# Patient Record
Sex: FEMALE | Race: BLACK OR AFRICAN AMERICAN | NOT HISPANIC OR LATINO
[De-identification: names, ages, dates, MRNs, and addresses within clinical notes are randomized per-mention and may not be internally consistent; named-entity substitution may affect disease eponyms.]

---

## 2019-06-03 ENCOUNTER — APPOINTMENT (OUTPATIENT)
Dept: ORTHOPEDIC SURGERY | Facility: CLINIC | Age: 69
End: 2019-06-03
Payer: MEDICARE

## 2019-06-03 VITALS — HEART RATE: 55 BPM | SYSTOLIC BLOOD PRESSURE: 146 MMHG | DIASTOLIC BLOOD PRESSURE: 77 MMHG | HEIGHT: 66 IN

## 2019-06-03 PROCEDURE — 73562 X-RAY EXAM OF KNEE 3: CPT | Mod: RT

## 2019-06-03 PROCEDURE — 72170 X-RAY EXAM OF PELVIS: CPT

## 2019-06-03 PROCEDURE — 99214 OFFICE O/P EST MOD 30 MIN: CPT

## 2019-06-03 NOTE — DISCUSSION/SUMMARY
[de-identified] : With predominantly right anterior knee pain following revision total knee replacement. Differential diagnosis includes possible subclinical patella clunk, borderline flexion stability, referred pain from lumbar spine. For now given note her primary care physician to refer patient for pain management for lumbar spine

## 2019-06-03 NOTE — HISTORY OF PRESENT ILLNESS
[de-identified] : 68 year old female presents s/p right TKR 2014.Patient began to express chronic intermittent right anterior knee pain with weightbearing. UltimatelyEyade underwent a "change in plastic" in her knee replacement subsequently.Postoperatively he reports increased chronic right anterior and at times lateral and proximal calf pain The pain is intermittent, chronic and is in the anterior and lateral aspect of the right knee. She reports that recently she has had back pain.Recent Blood tests By revision surgeon were According to the patient negative for infection in the right knee.

## 2019-06-03 NOTE — ADDENDUM
[FreeTextEntry1] : I, Raúl Santiago, acted solely as a scribe for Dr. Doroteo Wood on 06/03/2019  .\par  \par All medical record entries made by the scribe were at my, Dr. Doroteo Wood, direction and personally dictated by me on 06/03/2019. I have reviewed the chart and agree that the record accurately reflects my personal performance of the history, physical exam, assessment and plan. I have also personally directed, reviewed, and agreed with the chart.\par

## 2019-06-03 NOTE — PHYSICAL EXAM
[de-identified] : Well-nourished, in no acute distress\par Alert and oriented to time, place and person\par Skin: no lesions discoloration\par Respirations: unlabored\par Cardiac: no leg swelling\par Lymphatic: no groin adenopathy\par right knee: Healed neutral no effusion flexion 0/95 ligaments intact negative flexion distraction [de-identified] : X-rays right knee 3 views AP lateral sunrise satisfactory postop pins right total knee replacement\par

## 2021-05-19 ENCOUNTER — APPOINTMENT (OUTPATIENT)
Dept: ORTHOPEDIC SURGERY | Facility: CLINIC | Age: 71
End: 2021-05-19
Payer: MEDICARE

## 2021-05-19 DIAGNOSIS — M16.11 UNILATERAL PRIMARY OSTEOARTHRITIS, RIGHT HIP: ICD-10-CM

## 2021-05-19 PROCEDURE — 73562 X-RAY EXAM OF KNEE 3: CPT | Mod: RT

## 2021-05-19 PROCEDURE — 73501 X-RAY EXAM HIP UNI 1 VIEW: CPT | Mod: RT

## 2021-05-19 PROCEDURE — 99213 OFFICE O/P EST LOW 20 MIN: CPT

## 2021-05-21 PROBLEM — M16.11 PRIMARY LOCALIZED OSTEOARTHROSIS OF PELVIC REGION, RIGHT: Status: ACTIVE | Noted: 2021-05-21

## 2021-05-21 NOTE — HISTORY OF PRESENT ILLNESS
[de-identified] : Followup pain right knee Patient is status post right TKR 2014.Patient began to express chronic intermittent right anterior knee pain with weightbearing. Ultimately She underwent a "change in plastic" in her knee replacement subsequently.Postoperatively he reports increased chronic right anterior and at times lateral and proximal calf pain The pain is intermittent, chronic and is in the anterior and lateral aspect of the right knee. She reports that recently she has had back pain.And underwent epidural steroid injection with no symptom improvement Blood tests In the past By revision surgeon were According to the patient negative for infection in the right knee.

## 2021-05-21 NOTE — PHYSICAL EXAM
[de-identified] : Well-nourished, in no acute distress\par Alert and oriented to time, place and person\par Skin: no lesions discoloration\par Respirations: unlabored\par Cardiac: no leg swelling\par Lymphatic: no groin adenopathy\par right knee: Healed neutral no effusion flexion 0/95 ligaments intact negative flexion distraction\par Reflexes patella Achilles to posterior across 4 pound 5/5 like with left\par Right hip no local tenderness satisfactory pain-free passive motion [de-identified] : X-ray AP pelvis right hip to assess fracture postop appearance right total hip.\par X-ray right knee multiple views component alignment maintained

## 2021-05-21 NOTE — CONSULT LETTER
[Dear  ___] : Dear  [unfilled], [Consult Letter:] : I had the pleasure of evaluating your patient, [unfilled]. [Consult Closing:] : Thank you very much for allowing me to participate in the care of this patient.  If you have any questions, please do not hesitate to contact me. [Sincerely,] : Sincerely, [FreeTextEntry2] : JOSE VELÁSQUEZ [FreeTextEntry3] : Doroteo Wood MD, FAAOS\par Total Hip and Total Knee Replacement \par Anterior Total Hip Replacement\par [facility]\par [address]\par [place], [place] \par [phone]\par fax [phone]\par

## 2021-05-21 NOTE — DISCUSSION/SUMMARY
[de-identified] : Given persistent pain right knee satisfactory component alignment rule out latent deep infection. Plan MRI right knee, CBC differential C-reactive protein sedimentation rate

## 2021-05-24 ENCOUNTER — OUTPATIENT (OUTPATIENT)
Dept: OUTPATIENT SERVICES | Facility: HOSPITAL | Age: 71
LOS: 1 days | End: 2021-05-24

## 2021-05-28 ENCOUNTER — APPOINTMENT (OUTPATIENT)
Dept: MRI IMAGING | Facility: CLINIC | Age: 71
End: 2021-05-28
Payer: MEDICARE

## 2021-05-28 PROCEDURE — 73721 MRI JNT OF LWR EXTRE W/O DYE: CPT | Mod: 26,RT,ME

## 2021-05-28 PROCEDURE — G1004: CPT

## 2021-06-02 LAB
BASOPHILS # BLD AUTO: 0.06 K/UL
BASOPHILS NFR BLD AUTO: 0.9 %
CRP SERPL-MCNC: 23 MG/L
EOSINOPHIL # BLD AUTO: 0.06 K/UL
EOSINOPHIL NFR BLD AUTO: 0.9 %
ERYTHROCYTE [SEDIMENTATION RATE] IN BLOOD BY WESTERGREN METHOD: 14 MM/HR
HCT VFR BLD CALC: 45.7 %
HGB BLD-MCNC: 14.2 G/DL
IMM GRANULOCYTES NFR BLD AUTO: 0.2 %
LYMPHOCYTES # BLD AUTO: 2.44 K/UL
LYMPHOCYTES NFR BLD AUTO: 38.5 %
MAN DIFF?: NORMAL
MCHC RBC-ENTMCNC: 26.9 PG
MCHC RBC-ENTMCNC: 31.1 GM/DL
MCV RBC AUTO: 86.7 FL
MONOCYTES # BLD AUTO: 0.68 K/UL
MONOCYTES NFR BLD AUTO: 10.7 %
NEUTROPHILS # BLD AUTO: 3.09 K/UL
NEUTROPHILS NFR BLD AUTO: 48.8 %
PLATELET # BLD AUTO: 323 K/UL
RBC # BLD: 5.27 M/UL
RBC # FLD: 14.4 %
WBC # FLD AUTO: 6.34 K/UL

## 2021-06-09 ENCOUNTER — APPOINTMENT (OUTPATIENT)
Dept: ORTHOPEDIC SURGERY | Facility: CLINIC | Age: 71
End: 2021-06-09
Payer: MEDICARE

## 2021-06-09 PROCEDURE — 99213 OFFICE O/P EST LOW 20 MIN: CPT

## 2021-06-09 NOTE — DISCUSSION/SUMMARY
[de-identified] : Impression chronic pain right knee with past history of revision total knee replacement and right total hip replacement.  Positive positive radiographic and laboratory findings include markedly elevated C-reactive protein large effusion right knee and cortical hypertrophy around right femoral stem therefore rule out infection right knee, loosening right femoral stem\par Plan x-ray guided aspiration right knee for cell count culture and sensitivity, MRI right hip

## 2021-06-09 NOTE — CONSULT LETTER
[Dear  ___] : Dear  [unfilled], [Consult Letter:] : I had the pleasure of evaluating your patient, [unfilled]. [Consult Closing:] : Thank you very much for allowing me to participate in the care of this patient.  If you have any questions, please do not hesitate to contact me. [Sincerely,] : Sincerely, [FreeTextEntry2] : JOSE VELÁSQUEZ [FreeTextEntry3] : Doroteo Wood MD, FAAOS\par Total Hip and Total Knee Replacement \par Anterior Total Hip Replacement\par [facility]\par [address]\par [place], [place] \par [phone]\par fax [phone]\par

## 2021-06-09 NOTE — PHYSICAL EXAM
[de-identified] : Well-nourished, in no acute distress\par Alert and oriented to time, place and person\par Skin: no lesions discoloration\par Respirations: unlabored\par Cardiac: no leg swelling\par Lymphatic: no groin adenopathy\par right knee: Healed neutral effusion 1+ flexion 0/95 ligaments intact negative flexion distraction\par Reflexes patella Achilles to posterior across 4 pound 5/5 like with left\par Right hip no local tenderness satisfactory pain-free passive motion [de-identified] : MRI right knee May 28, 2021\par \par Review x-ray AP pelvis right hip reveals cortical hypertrophy around distal third of stem

## 2021-06-09 NOTE — HISTORY OF PRESENT ILLNESS
[de-identified] : Followup pain right knee Patient is status post right TKR 2014.Patient began to express chronic intermittent right anterior knee pain with weightbearing. Ultimately She underwent a "change in plastic" in her knee replacement subsequently.Postoperatively She reports increased chronic right anterior and lateral  and at times lateral and proximal calf pain The pain is intermittent, chronic and is in the anterior and lateral aspect of the right knee. She reports that recently she has had back pain.And underwent epidural steroid injection with no symptom improvement Blood tests In the past By revision surgeon were According to the patient negative for infection in the right knee.

## 2021-08-20 ENCOUNTER — OUTPATIENT (OUTPATIENT)
Dept: OUTPATIENT SERVICES | Facility: HOSPITAL | Age: 71
LOS: 1 days | End: 2021-08-20
Payer: MEDICARE

## 2021-08-20 ENCOUNTER — RESULT REVIEW (OUTPATIENT)
Age: 71
End: 2021-08-20

## 2021-08-20 ENCOUNTER — APPOINTMENT (OUTPATIENT)
Dept: ULTRASOUND IMAGING | Facility: CLINIC | Age: 71
End: 2021-08-20
Payer: MEDICARE

## 2021-08-20 DIAGNOSIS — Z96.651 PRESENCE OF RIGHT ARTIFICIAL KNEE JOINT: ICD-10-CM

## 2021-08-20 PROCEDURE — 20611 DRAIN/INJ JOINT/BURSA W/US: CPT | Mod: RT

## 2021-08-20 PROCEDURE — 20611 DRAIN/INJ JOINT/BURSA W/US: CPT

## 2021-11-05 DIAGNOSIS — Z96.641 PRESENCE OF RIGHT ARTIFICIAL HIP JOINT: ICD-10-CM

## 2022-04-12 ENCOUNTER — APPOINTMENT (OUTPATIENT)
Dept: ORTHOPEDIC SURGERY | Facility: CLINIC | Age: 72
End: 2022-04-12
Payer: MEDICARE

## 2022-04-12 VITALS — HEIGHT: 66 IN | WEIGHT: 161 LBS | BODY MASS INDEX: 25.88 KG/M2

## 2022-04-12 DIAGNOSIS — I51.9 HEART DISEASE, UNSPECIFIED: ICD-10-CM

## 2022-04-12 DIAGNOSIS — Z95.828 PRESENCE OF OTHER VASCULAR IMPLANTS AND GRAFTS: ICD-10-CM

## 2022-04-12 DIAGNOSIS — Z86.2 PERSONAL HISTORY OF DISEASES OF THE BLOOD AND BLOOD-FORMING ORGANS AND CERTAIN DISORDERS INVOLVING THE IMMUNE MECHANISM: ICD-10-CM

## 2022-04-12 PROCEDURE — 99213 OFFICE O/P EST LOW 20 MIN: CPT

## 2022-04-12 NOTE — ASSESSMENT
[FreeTextEntry1] : Previous doc:\par has had w/u for infection and negative - rev was linear exchange and did not help her - no obv instability - may have some tibial loosening - ? on xray - ROM is moderate 3-90 -- recommend bone scan eval for tibial loosening - review old documents\par 1/25/22: Bone scan pos for loosening - asp today r/o infection. Had cardiac stent in Oct 2021.\par 3/1/22: Discussed rev TKA for aseptic loosening - she is going to discuss this with her cardiologist and come back to discuss booking surgery with Dr. Adkins.\par \par 4/12/22: Continuing R knee pain. 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 Discussed the procedure in detail and she would like to proceed. f/up in 1 month

## 2022-04-12 NOTE — PHYSICAL EXAM
[Right] : right knee [] : lateral tibial plateau tenderness [4___] : quadriceps 4[unfilled]/5 [TWNoteComboBox7] : flexion 90 degrees [de-identified] : extension 3 degrees

## 2022-04-12 NOTE — HISTORY OF PRESENT ILLNESS
[10] : 10 [6] : 6 [Sharp] : sharp [Frequent] : frequent [Ice] : ice [Lying in bed] : lying in bed [de-identified] : 4/12/22: She followed up with her cardiogist- she is able to do surgery 6 months after stent placement \par \par Prev doc:\par Right TKA 2014 Dr. Wood which helped initially then developed more pain. Rev 2018 Dr. Samson no relief - poly exchange and synovectomy - . Right LISA 2011 Dr. Wood no issue. Lately is having some burning pain in right hip/buttock. Recent cardiac stent 10/8/21 - on brilinta. - constant pain in knee and cannot go up stairs\par 1/25/22: F/U bone scan, cont pain.\par 3/1/22: Felt better for a day after the aspiration but pain has returned. [] : no [de-identified] : dejan [de-identified] : right tka [de-identified] : xray [de-identified] : water therapy

## 2022-05-24 ENCOUNTER — APPOINTMENT (OUTPATIENT)
Dept: ORTHOPEDIC SURGERY | Facility: CLINIC | Age: 72
End: 2022-05-24
Payer: MEDICARE

## 2022-05-24 VITALS — WEIGHT: 155 LBS | BODY MASS INDEX: 24.91 KG/M2 | HEIGHT: 66 IN

## 2022-05-24 PROCEDURE — 99214 OFFICE O/P EST MOD 30 MIN: CPT

## 2022-05-24 NOTE — PHYSICAL EXAM
[Right] : right knee [] : lateral tibial plateau tenderness [4___] : quadriceps 4[unfilled]/5 [TWNoteComboBox7] : flexion 90 degrees [de-identified] : extension 3 degrees

## 2022-05-24 NOTE — DISCUSSION/SUMMARY
[Surgical risks reviewed] : Surgical risks reviewed [de-identified] : The natural progression of Osteoarthritis was explained to the patient.  We discussed the possible treatment options from conservative to operative.  These included NSAIDS, Glucosamine and Chondrotin sulfate, and Physical Therapy as well different types of injections.  We also discussed that at some point they may progress to needed a TKA.  Information and pamphlets were given when appropriate.\par \par Patient Complains of pain in Knee with a level that often reaches greater than a 8/10. The Pain has been\par progressively worsening of his/her treatment coarse. The pain has interfered with their ADLs and worsens with\par weight bearing. On exam they often have episodes of swelling/effusion with limited ROM. Pain worsens with ROM\par passive and active and I can palpate crepitus.\par  X rays were reviewed with the patient and they show joint space narrowing, subchondral sclerosis,\par osteophyte formation, and subchondral cysts.\par  After a period of more than 12 weeks physical therapy or exercise program done with me or another\par treating physician they have continued pain. The patient has failed a trial of NSAID medication or pain relieves if\par they were unable to tolerate NSAID medications as well as a series of injection, steroid or Hyaluronic Acid. After a\par long discussion with the patient both the patient and I have decided we have exhausted all forms of less radical\par treatments and they would like to proceed with Total Knee Replacement\par \par We discussed my findings and the natural history of their condition.  We talked about the details of the proposed surgery and the recovery.  We discussed the material risks, possible benefits and alternatives to surgery.  The risks include but are not limited to infection, bleeding and possible need for blood transfusion, fracture, bowel blockage, bladder retention or infection, need for reoperation, stiffness and/or limited range of motion, possible damage to nerves and blood vessels, failure of fixation of components, risk of deep vein thromboses and pulmonary embolism, wound healing problems, dislocation, and possible leg length discrepancy.  Although incredibly rare, we also discussed the risks of a cardiac event, stroke and even death during, or following, the surgery.  We discussed the type of implants the patient will be receiving and the type of fixation that will be used, as well as whether a robot or computer navigation aide will be used.  The patient understands they will need medical clearance and will attend a preoperative joint education class.  We also discussed the type of anesthesia they will receive, and the risks associated with hospital or rehab length of stay, obesity, diabetes and smoking.\par

## 2022-05-24 NOTE — HISTORY OF PRESENT ILLNESS
[6] : 6 [9] : 9 [Dull/Aching] : dull/aching [de-identified] : 5/24/22: continued pain in right knee. Scheduled for R rev TKA 7/13\par \par Prev doc:\par Right TKA 2014 Dr. Wood which helped initially then developed more pain. Rev 2018 Dr. Samson no relief - poly exchange and synovectomy - . Right LISA 2011 Dr. Wood no issue. Lately is having some burning pain in right hip/buttock. Recent cardiac stent 10/8/21 - on brilinta. - constant pain in knee and cannot go up stairs\par 1/25/22: F/U bone scan, cont pain.\par 3/1/22: Felt better for a day after the aspiration but pain has returned.\par 4/12/22: She followed up with her cardiogist- she is able to do surgery 6 months after stent placement

## 2022-05-24 NOTE — ASSESSMENT
[FreeTextEntry1] : Previous doc:\par has had w/u for infection and negative - rev was linear exchange and did not help her - no obv instability - may have some tibial loosening - ? on xray - ROM is moderate 3-90 -- recommend bone scan eval for tibial loosening - review old documents\par 1/25/22: Bone scan pos for loosening - asp today r/o infection. Had cardiac stent in Oct 2021.\par 3/1/22: Discussed rev TKA for aseptic loosening - she is going to discuss this with her cardiologist and come back to discuss booking surgery with Dr. Adkins.\par 4/12/22: Continuing R knee pain. 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 Discussed the procedure in detail and she would like to proceed. f/up in 1 month\par \par 5/24/22: Scheduled for R rev TKA in July. Risks and benefits again discussed and all questions answered. PCP believes elevated arsenic and mercury was from fish. Discussed risks due to antiphospholipid syndrome

## 2022-06-28 ENCOUNTER — OUTPATIENT (OUTPATIENT)
Dept: OUTPATIENT SERVICES | Facility: HOSPITAL | Age: 72
LOS: 1 days | Discharge: HOME | End: 2022-06-28

## 2022-06-28 VITALS
DIASTOLIC BLOOD PRESSURE: 84 MMHG | OXYGEN SATURATION: 100 % | HEIGHT: 66 IN | HEART RATE: 60 BPM | TEMPERATURE: 98 F | SYSTOLIC BLOOD PRESSURE: 149 MMHG | RESPIRATION RATE: 18 BRPM | WEIGHT: 157.85 LBS

## 2022-06-28 DIAGNOSIS — Z96.641 PRESENCE OF RIGHT ARTIFICIAL HIP JOINT: Chronic | ICD-10-CM

## 2022-06-28 DIAGNOSIS — Z96.651 PRESENCE OF RIGHT ARTIFICIAL KNEE JOINT: ICD-10-CM

## 2022-06-28 DIAGNOSIS — I10 ESSENTIAL (PRIMARY) HYPERTENSION: ICD-10-CM

## 2022-06-28 DIAGNOSIS — Z86.2 PERSONAL HISTORY OF DISEASES OF THE BLOOD AND BLOOD-FORMING ORGANS AND CERTAIN DISORDERS INVOLVING THE IMMUNE MECHANISM: ICD-10-CM

## 2022-06-28 DIAGNOSIS — Z96.651 PRESENCE OF RIGHT ARTIFICIAL KNEE JOINT: Chronic | ICD-10-CM

## 2022-06-28 DIAGNOSIS — I25.10 ATHEROSCLEROTIC HEART DISEASE OF NATIVE CORONARY ARTERY WITHOUT ANGINA PECTORIS: ICD-10-CM

## 2022-06-28 DIAGNOSIS — Z01.818 ENCOUNTER FOR OTHER PREPROCEDURAL EXAMINATION: ICD-10-CM

## 2022-06-28 LAB
A1C WITH ESTIMATED AVERAGE GLUCOSE RESULT: 5.7 % — HIGH (ref 4–5.6)
ALBUMIN SERPL ELPH-MCNC: 4 G/DL — SIGNIFICANT CHANGE UP (ref 3.3–5)
ALP SERPL-CCNC: 90 U/L — SIGNIFICANT CHANGE UP (ref 40–120)
ALT FLD-CCNC: 29 U/L — SIGNIFICANT CHANGE UP (ref 12–78)
ANION GAP SERPL CALC-SCNC: 5 MMOL/L — SIGNIFICANT CHANGE UP (ref 5–17)
APTT BLD: 34 SEC — SIGNIFICANT CHANGE UP (ref 27.5–35.5)
AST SERPL-CCNC: 28 U/L — SIGNIFICANT CHANGE UP (ref 15–37)
BASOPHILS # BLD AUTO: 0.05 K/UL — SIGNIFICANT CHANGE UP (ref 0–0.2)
BASOPHILS NFR BLD AUTO: 0.8 % — SIGNIFICANT CHANGE UP (ref 0–2)
BILIRUB SERPL-MCNC: 0.8 MG/DL — SIGNIFICANT CHANGE UP (ref 0.2–1.2)
BLD GP AB SCN SERPL QL: SIGNIFICANT CHANGE UP
BUN SERPL-MCNC: 19 MG/DL — SIGNIFICANT CHANGE UP (ref 7–23)
CALCIUM SERPL-MCNC: 10.1 MG/DL — SIGNIFICANT CHANGE UP (ref 8.5–10.1)
CHLORIDE SERPL-SCNC: 101 MMOL/L — SIGNIFICANT CHANGE UP (ref 96–108)
CO2 SERPL-SCNC: 33 MMOL/L — HIGH (ref 22–31)
CREAT SERPL-MCNC: 0.89 MG/DL — SIGNIFICANT CHANGE UP (ref 0.5–1.3)
EGFR: 69 ML/MIN/1.73M2 — SIGNIFICANT CHANGE UP
EOSINOPHIL # BLD AUTO: 0.11 K/UL — SIGNIFICANT CHANGE UP (ref 0–0.5)
EOSINOPHIL NFR BLD AUTO: 1.8 % — SIGNIFICANT CHANGE UP (ref 0–6)
ESTIMATED AVERAGE GLUCOSE: 117 MG/DL — HIGH (ref 68–114)
GLUCOSE SERPL-MCNC: 89 MG/DL — SIGNIFICANT CHANGE UP (ref 70–99)
HCT VFR BLD CALC: 45.1 % — HIGH (ref 34.5–45)
HGB BLD-MCNC: 15 G/DL — SIGNIFICANT CHANGE UP (ref 11.5–15.5)
IMM GRANULOCYTES NFR BLD AUTO: 0.3 % — SIGNIFICANT CHANGE UP (ref 0–1.5)
INR BLD: 0.97 RATIO — SIGNIFICANT CHANGE UP (ref 0.88–1.16)
LYMPHOCYTES # BLD AUTO: 1.9 K/UL — SIGNIFICANT CHANGE UP (ref 1–3.3)
LYMPHOCYTES # BLD AUTO: 30.7 % — SIGNIFICANT CHANGE UP (ref 13–44)
MCHC RBC-ENTMCNC: 27.9 PG — SIGNIFICANT CHANGE UP (ref 27–34)
MCHC RBC-ENTMCNC: 33.3 G/DL — SIGNIFICANT CHANGE UP (ref 32–36)
MCV RBC AUTO: 83.8 FL — SIGNIFICANT CHANGE UP (ref 80–100)
MONOCYTES # BLD AUTO: 0.7 K/UL — SIGNIFICANT CHANGE UP (ref 0–0.9)
MONOCYTES NFR BLD AUTO: 11.3 % — SIGNIFICANT CHANGE UP (ref 2–14)
MRSA PCR RESULT.: SIGNIFICANT CHANGE UP
NEUTROPHILS # BLD AUTO: 3.4 K/UL — SIGNIFICANT CHANGE UP (ref 1.8–7.4)
NEUTROPHILS NFR BLD AUTO: 55.1 % — SIGNIFICANT CHANGE UP (ref 43–77)
NRBC # BLD: 0 /100 WBCS — SIGNIFICANT CHANGE UP (ref 0–0)
PLATELET # BLD AUTO: 238 K/UL — SIGNIFICANT CHANGE UP (ref 150–400)
POTASSIUM SERPL-MCNC: 3.6 MMOL/L — SIGNIFICANT CHANGE UP (ref 3.5–5.3)
POTASSIUM SERPL-SCNC: 3.6 MMOL/L — SIGNIFICANT CHANGE UP (ref 3.5–5.3)
PROT SERPL-MCNC: 7.9 GM/DL — SIGNIFICANT CHANGE UP (ref 6–8.3)
PROTHROM AB SERPL-ACNC: 11.5 SEC — SIGNIFICANT CHANGE UP (ref 10.5–13.4)
RBC # BLD: 5.38 M/UL — HIGH (ref 3.8–5.2)
RBC # FLD: 13.6 % — SIGNIFICANT CHANGE UP (ref 10.3–14.5)
S AUREUS DNA NOSE QL NAA+PROBE: SIGNIFICANT CHANGE UP
SODIUM SERPL-SCNC: 139 MMOL/L — SIGNIFICANT CHANGE UP (ref 135–145)
WBC # BLD: 6.18 K/UL — SIGNIFICANT CHANGE UP (ref 3.8–10.5)
WBC # FLD AUTO: 6.18 K/UL — SIGNIFICANT CHANGE UP (ref 3.8–10.5)

## 2022-06-28 PROCEDURE — 93010 ELECTROCARDIOGRAM REPORT: CPT

## 2022-06-28 RX ORDER — SODIUM CHLORIDE 9 MG/ML
3 INJECTION INTRAMUSCULAR; INTRAVENOUS; SUBCUTANEOUS EVERY 8 HOURS
Refills: 0 | Status: DISCONTINUED | OUTPATIENT
Start: 2022-07-13 | End: 2022-07-15

## 2022-06-28 NOTE — H&P PST ADULT - PROBLEM SELECTOR PLAN 1
Right total knee arthroplasty revision attune  labs - cbc,pt/ptt,bmp,t&s,nose cx,ekg  M/C required  cardiac clearance  Rheumatology clearance   preop 3 day Hibiclens instruction reviewed and given .instructed on if  nose cx positive use mupuricin 5 days and checklist given  take routine meds DOS with sips of water. avoid NSAID and OTC supplements. verbalized understanding  information on proper nutrition , increase protein and better food choices provided in packet   Ensure clear given  Anesthesiologist to review PST labs, EKG, required clearances and optimization for surgery.

## 2022-06-28 NOTE — OCCUPATIONAL THERAPY INITIAL EVALUATION ADULT - LIVES WITH, PROFILE
and daughter in an apartment on the 11 floor with elevator access and not steps to negotiate. All living amenities are located on one level. The bathroom has a tub/shower combination, fixed shower head and standard toilet and with adequate space to fit a commode over it./spouse

## 2022-06-28 NOTE — OCCUPATIONAL THERAPY INITIAL EVALUATION ADULT - DEEP PRESSURE SENSATION, RLE, OT EVAL
No new care gaps identified.  Powered by [employer]. Reference number: [id number]. 9/17/2020 12:36:34 PM   CDT  
within normal limits

## 2022-06-28 NOTE — H&P PST ADULT - NSICDXPASTMEDICALHX_GEN_ALL_CORE_FT
PAST MEDICAL HISTORY:  CAD (coronary artery disease) s/p ptca w/stent x1    GERD (gastroesophageal reflux disease)     H/O antiphospholipid syndrome

## 2022-06-28 NOTE — OCCUPATIONAL THERAPY INITIAL EVALUATION ADULT - PERTINENT HX OF CURRENT PROBLEM, REHAB EVAL
Pt is a 73 y/o female slated for elective surgery for the 3rd revision of right TKR, with MD Adkins due to OA, chronic pain and DJD. Pt reported frequent buckling in her right knee, but denied any falls in the past 3-6 months

## 2022-06-28 NOTE — OCCUPATIONAL THERAPY INITIAL EVALUATION ADULT - ANTICIPATED DISCHARGE DISPOSITION, OT EVAL
Recommend home with 3-in-1 commode,and OT referral to enable patient to safely perform ADL management and functional mobility.  Pt has a rolling walker and SAC.

## 2022-06-28 NOTE — H&P PST ADULT - ASSESSMENT
72F pmh cad (s/p ptca w/stent x1), gerd, htn, antiphospholipid syndrome c/o right knee pain after right total knee replacement and revision, here for PST for scheduled Right total knee arthroplasty revision  CAPRINI SCORE    AGE RELATED RISK FACTORS                                                       MOBILITY RELATED FACTORS  [ ] Age 41-60 years                                            (1 Point)                  [ ] Bed rest                                                        (1 Point)  [ x] Age: 61-74 years                                           (2 Points)                [ ] Plaster cast                                                   (2 Points)  [ ] Age= 75 years                                              (3 Points)                 [ ] Bed bound for more than 72 hours                   (2 Points)    DISEASE RELATED RISK FACTORS                                               GENDER SPECIFIC FACTORS  [x ] Edema in the lower extremities                       (1 Point)                  [ ] Pregnancy                                                     (1 Point)  [ ] Varicose veins                                               (1 Point)                  [ ] Post-partum < 6 weeks                                   (1 Point)             [ ] BMI > 25 Kg/m2                                            (1 Point)                  [ ] Hormonal therapy  or oral contraception            (1 Point)                 [ ] Sepsis (in the previous month)                        (1 Point)                  [ ] History of pregnancy complications  [ ] Pneumonia or serious lung disease                                               [ ] Unexplained or recurrent                       (1 Point)           (in the previous month)                               (1 Point)  [ ] Abnormal pulmonary function test                     (1 Point)                 SURGERY RELATED RISK FACTORS  [ ] Acute myocardial infarction                              (1 Point)                 [ ]  Section                                            (1 Point)  [ ] Congestive heart failure (in the previous month)  (1 Point)                 [ ] Minor surgery                                                 (1 Point)   [ ] Inflammatory bowel disease                             (1 Point)                 [ ] Arthroscopic surgery                                        (2 Points)  [ ] Central venous access                                    (2 Points)                [ ] General surgery lasting more than 45 minutes   (2 Points)       [ ] Stroke (in the previous month)                          (5 Points)               [x ] Elective arthroplasty                                        (5 Points)                                                                                                                                               HEMATOLOGY RELATED FACTORS                                                 TRAUMA RELATED RISK FACTORS  [ ] Prior episodes of VTE                                     (3 Points)                 [ ] Fracture of the hip, pelvis, or leg                       (5 Points)  [ ] Positive family history for VTE                         (3 Points)                 [ ] Acute spinal cord injury (in the previous month)  (5 Points)  [ ] Prothrombin 43463 A                                      (3 Points)                 [ ] Paralysis  (less than 1 month)                          (5 Points)  [ ] Factor V Leiden                                             (3 Points)                 [ ] Multiple Trauma within 1 month                         (5 Points)  [ ] Lupus anticoagulants                                     (3 Points)                                                           [ ] Anticardiolipin antibodies                                (3 Points)                                                       [ ] High homocysteine in the blood                      (3 Points)                                             [ ] Other congenital or acquired thrombophilia       (3 Points)                                                [ ] Heparin induced thrombocytopenia                  (3 Points)                                          Total Score [      8    ]

## 2022-06-28 NOTE — OCCUPATIONAL THERAPY INITIAL EVALUATION ADULT - ADDITIONAL COMMENTS
Presently , pt is functioning in her roles, self sufficient, & ambulating independently at home and in the community with SAC. Pt does not drive; instead she relies on public transportation of her children to drive her to her appointments. Pt c/o 7/10 pain in her right knee. The pain is exacerbated, by walking, prolonged standing, negotiating steps and is relieved with ice. Pt is right hand dominant, wears glasses for reading and distance. Pt scores 90% of patient specific scale .

## 2022-06-28 NOTE — OCCUPATIONAL THERAPY INITIAL EVALUATION ADULT - SOCIAL CONCERNS
Pt voiced concerns about her recovery at home. Pt endorsed that her children will be able to assist her/him after discharge home post-operatively after they return home from work. Pt will be alone during the days./Complex psychosocial needs/coping issues Pt voiced concerns about her recovery at home. Pt endorsed that her children will only be able to assist her after discharge home post-operatively after they return home from work. Pt will be alone during the days./Complex psychosocial needs/coping issues

## 2022-06-28 NOTE — OCCUPATIONAL THERAPY INITIAL EVALUATION ADULT - GENERAL OBSERVATIONS, REHAB EVAL
Chart reviewed. Patient encountered seated in chair in rehab preop room in [facility]. Patient underwent occupational therapy pre-operative consultation to determine current functional ADL limitations in order to provide the right equipment for patient to perform functional ADL post operation.

## 2022-06-28 NOTE — H&P PST ADULT - NSICDXPASTSURGICALHX_GEN_ALL_CORE_FT
PAST SURGICAL HISTORY:  H/O total knee replacement, right     History of total right hip replacement

## 2022-06-29 LAB — VIT D25+D1,25 OH+D1,25 PNL SERPL-MCNC: 53.8 PG/ML — SIGNIFICANT CHANGE UP (ref 19.9–79.3)

## 2022-07-11 ENCOUNTER — OUTPATIENT (OUTPATIENT)
Dept: OUTPATIENT SERVICES | Facility: HOSPITAL | Age: 72
LOS: 1 days | Discharge: HOME | End: 2022-07-11

## 2022-07-11 DIAGNOSIS — Z96.651 PRESENCE OF RIGHT ARTIFICIAL KNEE JOINT: Chronic | ICD-10-CM

## 2022-07-11 DIAGNOSIS — U07.1 COVID-19: ICD-10-CM

## 2022-07-11 DIAGNOSIS — Z96.641 PRESENCE OF RIGHT ARTIFICIAL HIP JOINT: Chronic | ICD-10-CM

## 2022-07-11 PROBLEM — Z86.2 PERSONAL HISTORY OF DISEASES OF THE BLOOD AND BLOOD-FORMING ORGANS AND CERTAIN DISORDERS INVOLVING THE IMMUNE MECHANISM: Chronic | Status: ACTIVE | Noted: 2022-06-28

## 2022-07-11 PROBLEM — I25.10 ATHEROSCLEROTIC HEART DISEASE OF NATIVE CORONARY ARTERY WITHOUT ANGINA PECTORIS: Chronic | Status: ACTIVE | Noted: 2022-06-28

## 2022-07-11 PROBLEM — K21.9 GASTRO-ESOPHAGEAL REFLUX DISEASE WITHOUT ESOPHAGITIS: Chronic | Status: ACTIVE | Noted: 2022-06-28

## 2022-07-11 LAB
FLUAV AG NPH QL: SIGNIFICANT CHANGE UP
FLUBV AG NPH QL: SIGNIFICANT CHANGE UP
SARS-COV-2 RNA SPEC QL NAA+PROBE: SIGNIFICANT CHANGE UP

## 2022-07-12 ENCOUNTER — FORM ENCOUNTER (OUTPATIENT)
Age: 72
End: 2022-07-12

## 2022-07-13 ENCOUNTER — RESULT REVIEW (OUTPATIENT)
Age: 72
End: 2022-07-13

## 2022-07-13 ENCOUNTER — INPATIENT (INPATIENT)
Facility: HOSPITAL | Age: 72
LOS: 1 days | Discharge: HOME | End: 2022-07-15
Attending: ORTHOPAEDIC SURGERY | Admitting: ORTHOPAEDIC SURGERY

## 2022-07-13 ENCOUNTER — APPOINTMENT (OUTPATIENT)
Dept: ORTHOPEDIC SURGERY | Facility: HOSPITAL | Age: 72
End: 2022-07-13

## 2022-07-13 ENCOUNTER — TRANSCRIPTION ENCOUNTER (OUTPATIENT)
Age: 72
End: 2022-07-13

## 2022-07-13 VITALS
OXYGEN SATURATION: 99 % | WEIGHT: 154.98 LBS | DIASTOLIC BLOOD PRESSURE: 73 MMHG | RESPIRATION RATE: 16 BRPM | SYSTOLIC BLOOD PRESSURE: 156 MMHG | HEIGHT: 66 IN | HEART RATE: 64 BPM | TEMPERATURE: 98 F

## 2022-07-13 DIAGNOSIS — Z96.641 PRESENCE OF RIGHT ARTIFICIAL HIP JOINT: Chronic | ICD-10-CM

## 2022-07-13 DIAGNOSIS — Z96.651 PRESENCE OF RIGHT ARTIFICIAL KNEE JOINT: Chronic | ICD-10-CM

## 2022-07-13 LAB
ANION GAP SERPL CALC-SCNC: 6 MMOL/L — SIGNIFICANT CHANGE UP (ref 5–17)
BUN SERPL-MCNC: 16 MG/DL — SIGNIFICANT CHANGE UP (ref 7–23)
CALCIUM SERPL-MCNC: 9.6 MG/DL — SIGNIFICANT CHANGE UP (ref 8.5–10.1)
CHLORIDE SERPL-SCNC: 106 MMOL/L — SIGNIFICANT CHANGE UP (ref 96–108)
CO2 SERPL-SCNC: 32 MMOL/L — HIGH (ref 22–31)
CREAT SERPL-MCNC: 0.89 MG/DL — SIGNIFICANT CHANGE UP (ref 0.5–1.3)
EGFR: 69 ML/MIN/1.73M2 — SIGNIFICANT CHANGE UP
GLUCOSE SERPL-MCNC: 85 MG/DL — SIGNIFICANT CHANGE UP (ref 70–99)
HCT VFR BLD CALC: 39.8 % — SIGNIFICANT CHANGE UP (ref 34.5–45)
HGB BLD-MCNC: 13.1 G/DL — SIGNIFICANT CHANGE UP (ref 11.5–15.5)
MCHC RBC-ENTMCNC: 28.2 PG — SIGNIFICANT CHANGE UP (ref 27–34)
MCHC RBC-ENTMCNC: 32.9 G/DL — SIGNIFICANT CHANGE UP (ref 32–36)
MCV RBC AUTO: 85.6 FL — SIGNIFICANT CHANGE UP (ref 80–100)
NRBC # BLD: 0 /100 WBCS — SIGNIFICANT CHANGE UP (ref 0–0)
PLATELET # BLD AUTO: 213 K/UL — SIGNIFICANT CHANGE UP (ref 150–400)
POTASSIUM SERPL-MCNC: 4.4 MMOL/L — SIGNIFICANT CHANGE UP (ref 3.5–5.3)
POTASSIUM SERPL-SCNC: 4.4 MMOL/L — SIGNIFICANT CHANGE UP (ref 3.5–5.3)
RBC # BLD: 4.65 M/UL — SIGNIFICANT CHANGE UP (ref 3.8–5.2)
RBC # FLD: 13.7 % — SIGNIFICANT CHANGE UP (ref 10.3–14.5)
SODIUM SERPL-SCNC: 144 MMOL/L — SIGNIFICANT CHANGE UP (ref 135–145)
WBC # BLD: 6.76 K/UL — SIGNIFICANT CHANGE UP (ref 3.8–10.5)
WBC # FLD AUTO: 6.76 K/UL — SIGNIFICANT CHANGE UP (ref 3.8–10.5)

## 2022-07-13 PROCEDURE — 88331 PATH CONSLTJ SURG 1 BLK 1SPC: CPT | Mod: 26

## 2022-07-13 PROCEDURE — 88300 SURGICAL PATH GROSS: CPT | Mod: 26,59

## 2022-07-13 PROCEDURE — 88305 TISSUE EXAM BY PATHOLOGIST: CPT | Mod: 26

## 2022-07-13 PROCEDURE — 27487 REVISE/REPLACE KNEE JOINT: CPT | Mod: AS,RT

## 2022-07-13 PROCEDURE — 27487 REVISE/REPLACE KNEE JOINT: CPT | Mod: RT

## 2022-07-13 DEVICE — IMPLANTABLE DEVICE: Type: IMPLANTABLE DEVICE | Site: RIGHT | Status: FUNCTIONAL

## 2022-07-13 DEVICE — CEMENT SIMPLEX WITH TOBRAMYCIN: Type: IMPLANTABLE DEVICE | Site: RIGHT | Status: FUNCTIONAL

## 2022-07-13 DEVICE — FEM TIB BASE ATTUNE CEM SZ 3: Type: IMPLANTABLE DEVICE | Site: RIGHT | Status: FUNCTIONAL

## 2022-07-13 RX ORDER — TICAGRELOR 90 MG/1
90 TABLET ORAL EVERY 12 HOURS
Refills: 0 | Status: DISCONTINUED | OUTPATIENT
Start: 2022-07-14 | End: 2022-07-15

## 2022-07-13 RX ORDER — OMEPRAZOLE 10 MG/1
1 CAPSULE, DELAYED RELEASE ORAL
Qty: 0 | Refills: 0 | DISCHARGE

## 2022-07-13 RX ORDER — BENZOCAINE AND MENTHOL 5; 1 G/100ML; G/100ML
1 LIQUID ORAL EVERY 4 HOURS
Refills: 0 | Status: DISCONTINUED | OUTPATIENT
Start: 2022-07-13 | End: 2022-07-15

## 2022-07-13 RX ORDER — PANTOPRAZOLE SODIUM 20 MG/1
40 TABLET, DELAYED RELEASE ORAL
Refills: 0 | Status: DISCONTINUED | OUTPATIENT
Start: 2022-07-13 | End: 2022-07-15

## 2022-07-13 RX ORDER — KETOROLAC TROMETHAMINE 30 MG/ML
30 SYRINGE (ML) INJECTION EVERY 6 HOURS
Refills: 0 | Status: DISCONTINUED | OUTPATIENT
Start: 2022-07-13 | End: 2022-07-14

## 2022-07-13 RX ORDER — AMLODIPINE BESYLATE 2.5 MG/1
10 TABLET ORAL DAILY
Refills: 0 | Status: DISCONTINUED | OUTPATIENT
Start: 2022-07-13 | End: 2022-07-15

## 2022-07-13 RX ORDER — TICAGRELOR 90 MG/1
1 TABLET ORAL
Qty: 0 | Refills: 0 | DISCHARGE

## 2022-07-13 RX ORDER — CELECOXIB 200 MG/1
200 CAPSULE ORAL EVERY 12 HOURS
Refills: 0 | Status: DISCONTINUED | OUTPATIENT
Start: 2022-07-14 | End: 2022-07-15

## 2022-07-13 RX ORDER — PANTOPRAZOLE SODIUM 20 MG/1
40 TABLET, DELAYED RELEASE ORAL
Refills: 0 | Status: DISCONTINUED | OUTPATIENT
Start: 2022-07-13 | End: 2022-07-14

## 2022-07-13 RX ORDER — ACETAMINOPHEN 500 MG
975 TABLET ORAL EVERY 8 HOURS
Refills: 0 | Status: DISCONTINUED | OUTPATIENT
Start: 2022-07-13 | End: 2022-07-15

## 2022-07-13 RX ORDER — TRAMADOL HYDROCHLORIDE 50 MG/1
50 TABLET ORAL EVERY 4 HOURS
Refills: 0 | Status: DISCONTINUED | OUTPATIENT
Start: 2022-07-13 | End: 2022-07-15

## 2022-07-13 RX ORDER — SPIRONOLACT/HYDROCHLOROTHIAZID 25 MG-25MG
1 TABLET ORAL
Qty: 0 | Refills: 0 | DISCHARGE

## 2022-07-13 RX ORDER — SPIRONOLACTONE 25 MG/1
25 TABLET, FILM COATED ORAL DAILY
Refills: 0 | Status: DISCONTINUED | OUTPATIENT
Start: 2022-07-13 | End: 2022-07-15

## 2022-07-13 RX ORDER — FENTANYL CITRATE 50 UG/ML
25 INJECTION INTRAVENOUS
Refills: 0 | Status: DISCONTINUED | OUTPATIENT
Start: 2022-07-13 | End: 2022-07-13

## 2022-07-13 RX ORDER — AMLODIPINE BESYLATE 2.5 MG/1
1 TABLET ORAL
Qty: 0 | Refills: 0 | DISCHARGE

## 2022-07-13 RX ORDER — HYDROXYCHLOROQUINE SULFATE 200 MG
200 TABLET ORAL DAILY
Refills: 0 | Status: DISCONTINUED | OUTPATIENT
Start: 2022-07-13 | End: 2022-07-14

## 2022-07-13 RX ORDER — ASPIRIN/CALCIUM CARB/MAGNESIUM 324 MG
81 TABLET ORAL
Refills: 0 | Status: DISCONTINUED | OUTPATIENT
Start: 2022-07-14 | End: 2022-07-15

## 2022-07-13 RX ORDER — ONDANSETRON 8 MG/1
4 TABLET, FILM COATED ORAL EVERY 6 HOURS
Refills: 0 | Status: DISCONTINUED | OUTPATIENT
Start: 2022-07-13 | End: 2022-07-15

## 2022-07-13 RX ORDER — MAGNESIUM HYDROXIDE 400 MG/1
30 TABLET, CHEWABLE ORAL DAILY
Refills: 0 | Status: DISCONTINUED | OUTPATIENT
Start: 2022-07-13 | End: 2022-07-15

## 2022-07-13 RX ORDER — SENNA PLUS 8.6 MG/1
2 TABLET ORAL AT BEDTIME
Refills: 0 | Status: DISCONTINUED | OUTPATIENT
Start: 2022-07-13 | End: 2022-07-15

## 2022-07-13 RX ORDER — SODIUM CHLORIDE 9 MG/ML
1000 INJECTION INTRAMUSCULAR; INTRAVENOUS; SUBCUTANEOUS
Refills: 0 | Status: DISCONTINUED | OUTPATIENT
Start: 2022-07-13 | End: 2022-07-15

## 2022-07-13 RX ORDER — CELECOXIB 200 MG/1
200 CAPSULE ORAL ONCE
Refills: 0 | Status: COMPLETED | OUTPATIENT
Start: 2022-07-13 | End: 2022-07-13

## 2022-07-13 RX ORDER — ONDANSETRON 8 MG/1
4 TABLET, FILM COATED ORAL ONCE
Refills: 0 | Status: DISCONTINUED | OUTPATIENT
Start: 2022-07-13 | End: 2022-07-13

## 2022-07-13 RX ORDER — POLYETHYLENE GLYCOL 3350 17 G/17G
17 POWDER, FOR SOLUTION ORAL AT BEDTIME
Refills: 0 | Status: DISCONTINUED | OUTPATIENT
Start: 2022-07-13 | End: 2022-07-15

## 2022-07-13 RX ORDER — HYDROCHLOROTHIAZIDE 25 MG
25 TABLET ORAL DAILY
Refills: 0 | Status: DISCONTINUED | OUTPATIENT
Start: 2022-07-13 | End: 2022-07-15

## 2022-07-13 RX ORDER — CEFAZOLIN SODIUM 1 G
2000 VIAL (EA) INJECTION EVERY 8 HOURS
Refills: 0 | Status: COMPLETED | OUTPATIENT
Start: 2022-07-13 | End: 2022-07-14

## 2022-07-13 RX ORDER — LANOLIN ALCOHOL/MO/W.PET/CERES
3 CREAM (GRAM) TOPICAL AT BEDTIME
Refills: 0 | Status: DISCONTINUED | OUTPATIENT
Start: 2022-07-13 | End: 2022-07-15

## 2022-07-13 RX ORDER — ACETAMINOPHEN 500 MG
1000 TABLET ORAL ONCE
Refills: 0 | Status: COMPLETED | OUTPATIENT
Start: 2022-07-13 | End: 2022-07-13

## 2022-07-13 RX ORDER — DEXAMETHASONE 0.5 MG/5ML
10 ELIXIR ORAL ONCE
Refills: 0 | Status: COMPLETED | OUTPATIENT
Start: 2022-07-14 | End: 2022-07-14

## 2022-07-13 RX ORDER — SODIUM CHLORIDE 9 MG/ML
1000 INJECTION, SOLUTION INTRAVENOUS
Refills: 0 | Status: DISCONTINUED | OUTPATIENT
Start: 2022-07-13 | End: 2022-07-13

## 2022-07-13 RX ADMIN — CELECOXIB 200 MILLIGRAM(S): 200 CAPSULE ORAL at 14:53

## 2022-07-13 RX ADMIN — Medication 1000 MILLIGRAM(S): at 21:50

## 2022-07-13 RX ADMIN — Medication 30 MILLIGRAM(S): at 21:50

## 2022-07-13 RX ADMIN — SODIUM CHLORIDE 3 MILLILITER(S): 9 INJECTION INTRAMUSCULAR; INTRAVENOUS; SUBCUTANEOUS at 21:35

## 2022-07-13 RX ADMIN — Medication 30 MILLIGRAM(S): at 21:36

## 2022-07-13 RX ADMIN — Medication 400 MILLIGRAM(S): at 21:35

## 2022-07-13 RX ADMIN — CELECOXIB 200 MILLIGRAM(S): 200 CAPSULE ORAL at 15:06

## 2022-07-13 NOTE — PATIENT PROFILE ADULT - FALL HARM RISK - HARM RISK INTERVENTIONS

## 2022-07-13 NOTE — BRIEF OPERATIVE NOTE - ANTIBIOTIC PROTOCOL
HPI and Plan:   See dictation    Orders Placed This Encounter   Procedures     Lipid Profile     EKG 12-lead complete w/read - Clinics (performed today)       Orders Placed This Encounter   Medications     loratadine (CLARITIN) 10 MG tablet     Sig: Take 10 mg by mouth daily     OnabotulinumtoxinA (BOTOX IJ)       Encounter Diagnoses   Name Primary?     High cholesterol Yes     Atypical chest pain        CURRENT MEDICATIONS:  Current Outpatient Prescriptions   Medication Sig Dispense Refill     loratadine (CLARITIN) 10 MG tablet Take 10 mg by mouth daily       OnabotulinumtoxinA (BOTOX IJ)        NORTRIPTYLINE HCL PO Take 200 mg by mouth daily       PAROXETINE HCL PO Take 25 mg by mouth daily       diltiazem (CARDIZEM CD) 120 MG 24 hr CD capsule Take 1 capsule (120 mg) by mouth daily You are due to see the cardiologist- please call [phone] to schedule. 90 capsule 0     gabapentin (NEURONTIN) 100 MG capsule Take 100 mg in the morning and 400 mg at bedtime for one week, then increase to 100 mg in the morning and 600 mg at bedtime. (Patient taking differently: Take 400 mg in the morning and 600 mg at bedtime.) 210 capsule 1       ALLERGIES     Allergies   Allergen Reactions     Codeine Sulfate Nausea and Vomiting     Contrast Dye Hives     Patient is allergic to CT iodine contrast.       Scopolamine Visual Disturbance     Imitrex [Sumatriptan] Rash     Naproxen Rash     Penicillins Rash     Per patient     Sulfa Drugs Rash       PAST MEDICAL HISTORY:  Past Medical History   Diagnosis Date     Abdominal pain      Anxiety      Asthma      C. difficile colitis      Headache(784.0) 12/10/2014     High cholesterol      Hyperlipidemia      Liver disease      Being evalted for fatty liver disease. ABnormal LFT.     Migraines      PONV (postoperative nausea and vomiting)      Sleep apnea      CPAP will bring.     Tremor        PAST SURGICAL HISTORY:  Past Surgical History   Procedure Laterality Date     Foot surgery        Bilateral foot reconstruction.      section       witth TL     Laparoscopic hysterectomy supracervical, bilateral salpingo-oophorectomy, combined  3/6/2013     Procedure: COMBINED LAPAROSCOPIC HYSTERECTOMY SUPRACERVICAL, SALPINGO-OOPHORECTOMY;  Laparoscopic Supracervical Hysterectomy, Right salpingo-oopherectomy;  Surgeon: Tanika Jones MD;  Location: [facility] OR     Uvulopalatopharyngoplasty       Plus Septoplasy.     Esophagoscopy, gastroscopy, duodenoscopy (egd), combined  10/21/2013     Procedure: COMBINED ESOPHAGOSCOPY, GASTROSCOPY, DUODENOSCOPY (EGD), BIOPSY SINGLE OR MULTIPLE;;  Surgeon: Rito Gupta MD;  Location:  GI     Hysterectomy       Endoscopic ultrasound, esophagoscopy, gastroscopy, duodenoscopy (egd), combined  13     Hc ugi endoscopy w eus  2013     Procedure: COMBINED ENDOSCOPIC ULTRASOUND, ESOPHAGOSCOPY, GASTROSCOPY, DUODENOSCOPY (EGD);  Surgeon: Emre Campbell MD;  Location:  GI       FAMILY HISTORY:  Family History   Problem Relation Age of Onset     Family history unknown: Yes       SOCIAL HISTORY:  Social History     Social History     Marital status:      Spouse name: N/A     Number of children: N/A     Years of education: N/A     Social History Main Topics     Smoking status: Current Every Day Smoker     Packs/day: 0.50     Years: 20.00     Types: Cigarettes     Smokeless tobacco: Never Used     Alcohol use 0.0 oz/week     0 Standard drinks or equivalent per week      Comment:  3 drinks weekly     Drug use: No     Sexual activity: Yes     Partners: Male     Other Topics Concern     None     Social History Narrative       Review of Systems:  Skin:  Negative     Eyes:  Positive for visual blurring  ENT:  Negative    Respiratory:  Positive for shortness of breath;cough;wheezing  Cardiovascular:    chest pain;edema;fatigue;Positive for  Gastroenterology: Positive for nausea  Genitourinary:  not assessed    Musculoskeletal:  Positive for neck pain;joint  "pain;muscular weakness  Neurologic:  Positive for headaches  Psychiatric:  Positive for anxiety  Heme/Lymph/Imm:  Positive for allergies;hay fever  Endocrine:  Negative      Physical Exam:  Vitals: /70  Pulse 82  Ht 1.626 m (5' 4\")  Wt 73 kg (161 lb)  LMP 07/11/2014  BMI 27.64 kg/m2    Constitutional:           Skin:           Head:           Eyes:           ENT:           Neck:           Chest:           Cardiac:                    Abdomen:           Vascular:                                        Extremities and Back:           Neurological:             Recent Lab Results:  LIPID RESULTS:  Lab Results   Component Value Date    CHOL 182 09/19/2011    HDL 49 (L) 09/19/2011     09/19/2011    TRIG 151 (H) 09/19/2011    CHOLHDLRATIO 3.7 09/19/2011       LIVER ENZYME RESULTS:  Lab Results   Component Value Date    AST 20 10/05/2016    ALT 42 10/05/2016       CBC RESULTS:  Lab Results   Component Value Date    WBC 10.5 10/05/2016    RBC 4.61 10/05/2016    HGB 14.4 10/05/2016    HCT 42.6 10/05/2016    MCV 92 10/05/2016    MCH 31.2 10/05/2016    MCHC 33.8 10/05/2016    RDW 12.2 10/05/2016     10/05/2016       BMP RESULTS:  Lab Results   Component Value Date     10/05/2016    POTASSIUM 4.2 10/05/2016    CHLORIDE 104 10/05/2016    CO2 26 10/05/2016    ANIONGAP 8 10/05/2016     (H) 10/05/2016    BUN 17 10/05/2016    CR 0.98 10/05/2016    GFRESTIMATED 61 10/05/2016    GFRESTBLACK 74 10/05/2016    DEWAYNE 9.0 10/05/2016        A1C RESULTS:  No results found for: A1C    INR RESULTS:  Lab Results   Component Value Date    INR 1.13 03/22/2013           CC  No referring provider defined for this encounter.                  " Followed protocol

## 2022-07-13 NOTE — CONSULT NOTE ADULT - SUBJECTIVE AND OBJECTIVE BOX
MICHELLE BONILLA is a 72y Female s/p RIGHT TOTAL KNEE ARTHROPLASTY REVISION ATTUNE      w/ h/o CAD (coronary artery disease)    GERD (gastroesophageal reflux disease)    H/O antiphospholipid syndrome      denies any chest pain shortness of breath palpitation dizziness lightheadedness nausea vomiting fever or chills    H/O total knee replacement, right    History of total right hip replacement        SH: doesnot smoke or drink at this time    codeine (Other)  Dilaudid (Other)  Percocet (Other)    acetaminophen     Tablet .. 975 milliGRAM(s) Oral every 8 hours  acetaminophen   IVPB .. 1000 milliGRAM(s) IV Intermittent once  amLODIPine   Tablet 10 milliGRAM(s) Oral daily  benzocaine 15 mG/menthol 3.6 mG Lozenge 1 Lozenge Oral every 4 hours PRN  ceFAZolin   IVPB 2000 milliGRAM(s) IV Intermittent every 8 hours  hydrochlorothiazide 25 milliGRAM(s) Oral daily  hydroxychloroquine 200 milliGRAM(s) Oral daily  ketorolac   Injectable 30 milliGRAM(s) IV Push every 6 hours  magnesium hydroxide Suspension 30 milliLiter(s) Oral daily PRN  melatonin 3 milliGRAM(s) Oral at bedtime PRN  multivitamin 1 Tablet(s) Oral daily  ondansetron Injectable 4 milliGRAM(s) IV Push every 6 hours PRN  pantoprazole    Tablet 40 milliGRAM(s) Oral before breakfast  pantoprazole    Tablet 40 milliGRAM(s) Oral before breakfast  polyethylene glycol 3350 17 Gram(s) Oral at bedtime  senna 2 Tablet(s) Oral at bedtime  sodium chloride 0.9% lock flush 3 milliLiter(s) IV Push every 8 hours  sodium chloride 0.9%. 1000 milliLiter(s) IV Continuous <Continuous>  spironolactone 25 milliGRAM(s) Oral daily  traMADol 50 milliGRAM(s) Oral every 4 hours PRN    T(C): 36.7 (07-13-22 @ 20:40), Max: 36.7 (07-13-22 @ 14:16)  HR: 55 (07-13-22 @ 20:40) (52 - 64)  BP: 109/72 (07-13-22 @ 20:40) (109/72 - 156/73)  RR: 18 (07-13-22 @ 20:40) (13 - 18)  SpO2: 95% (07-13-22 @ 20:40) (95% - 99%)  HEENT unremarkable  neck no JVD or bruit  heart normal S1 S2 RRR no gallops or rubs  chest clear to auscultation  abd sof nontender non distended +bs  ext no calf tenderness    A/P   DVT PX  pain control  bowel regimen   wound care as per ortho  GI PX  antiemetics prn  incentive spirometer

## 2022-07-13 NOTE — PATIENT PROFILE ADULT - NSPROPTRIGHTCAREGIVER_GEN_A_NUR
yes
I will SWITCH the dose or number of times a day I take the medications listed below when I get home from the hospital:    Coreg 25 mg oral tablet  -- 1 tab(s) by mouth 2 times a day    hydrALAZINE 25 mg oral tablet  -- 1 tab(s) by mouth 3 times a day    labetalol 200 mg oral tablet  -- 1 tab(s) by mouth 3 times a day    predniSONE 5 mg oral tablet  -- 1 tab(s) by mouth once a day

## 2022-07-14 ENCOUNTER — TRANSCRIPTION ENCOUNTER (OUTPATIENT)
Age: 72
End: 2022-07-14

## 2022-07-14 LAB
ANION GAP SERPL CALC-SCNC: 5 MMOL/L — SIGNIFICANT CHANGE UP (ref 5–17)
BUN SERPL-MCNC: 20 MG/DL — SIGNIFICANT CHANGE UP (ref 7–23)
CALCIUM SERPL-MCNC: 9 MG/DL — SIGNIFICANT CHANGE UP (ref 8.5–10.1)
CHLORIDE SERPL-SCNC: 105 MMOL/L — SIGNIFICANT CHANGE UP (ref 96–108)
CO2 SERPL-SCNC: 32 MMOL/L — HIGH (ref 22–31)
CREAT SERPL-MCNC: 0.82 MG/DL — SIGNIFICANT CHANGE UP (ref 0.5–1.3)
EGFR: 76 ML/MIN/1.73M2 — SIGNIFICANT CHANGE UP
GLUCOSE SERPL-MCNC: 108 MG/DL — HIGH (ref 70–99)
GRAM STN FLD: SIGNIFICANT CHANGE UP
HCT VFR BLD CALC: 38.1 % — SIGNIFICANT CHANGE UP (ref 34.5–45)
HGB BLD-MCNC: 12.4 G/DL — SIGNIFICANT CHANGE UP (ref 11.5–15.5)
MCHC RBC-ENTMCNC: 27.7 PG — SIGNIFICANT CHANGE UP (ref 27–34)
MCHC RBC-ENTMCNC: 32.5 G/DL — SIGNIFICANT CHANGE UP (ref 32–36)
MCV RBC AUTO: 85 FL — SIGNIFICANT CHANGE UP (ref 80–100)
NRBC # BLD: 0 /100 WBCS — SIGNIFICANT CHANGE UP (ref 0–0)
PLATELET # BLD AUTO: 206 K/UL — SIGNIFICANT CHANGE UP (ref 150–400)
POTASSIUM SERPL-MCNC: 4.1 MMOL/L — SIGNIFICANT CHANGE UP (ref 3.5–5.3)
POTASSIUM SERPL-SCNC: 4.1 MMOL/L — SIGNIFICANT CHANGE UP (ref 3.5–5.3)
RBC # BLD: 4.48 M/UL — SIGNIFICANT CHANGE UP (ref 3.8–5.2)
RBC # FLD: 13.4 % — SIGNIFICANT CHANGE UP (ref 10.3–14.5)
SODIUM SERPL-SCNC: 142 MMOL/L — SIGNIFICANT CHANGE UP (ref 135–145)
SPECIMEN SOURCE: SIGNIFICANT CHANGE UP
WBC # BLD: 10.77 K/UL — HIGH (ref 3.8–10.5)
WBC # FLD AUTO: 10.77 K/UL — HIGH (ref 3.8–10.5)

## 2022-07-14 PROCEDURE — 73560 X-RAY EXAM OF KNEE 1 OR 2: CPT | Mod: 26,RT

## 2022-07-14 RX ORDER — ASPIRIN/CALCIUM CARB/MAGNESIUM 324 MG
1 TABLET ORAL
Qty: 0 | Refills: 0 | DISCHARGE
Start: 2022-07-14

## 2022-07-14 RX ORDER — ASPIRIN/CALCIUM CARB/MAGNESIUM 324 MG
1 TABLET ORAL
Qty: 0 | Refills: 0 | DISCHARGE

## 2022-07-14 RX ORDER — CELECOXIB 200 MG/1
1 CAPSULE ORAL
Qty: 60 | Refills: 0
Start: 2022-07-14 | End: 2022-08-12

## 2022-07-14 RX ORDER — ACETAMINOPHEN 500 MG
1000 TABLET ORAL ONCE
Refills: 0 | Status: COMPLETED | OUTPATIENT
Start: 2022-07-14 | End: 2022-07-14

## 2022-07-14 RX ORDER — SENNA PLUS 8.6 MG/1
2 TABLET ORAL
Qty: 0 | Refills: 0 | DISCHARGE
Start: 2022-07-14

## 2022-07-14 RX ORDER — POLYETHYLENE GLYCOL 3350 17 G/17G
17 POWDER, FOR SOLUTION ORAL
Qty: 0 | Refills: 0 | DISCHARGE
Start: 2022-07-14

## 2022-07-14 RX ORDER — ACETAMINOPHEN 500 MG
3 TABLET ORAL
Qty: 0 | Refills: 0 | DISCHARGE
Start: 2022-07-14

## 2022-07-14 RX ADMIN — SENNA PLUS 2 TABLET(S): 8.6 TABLET ORAL at 22:20

## 2022-07-14 RX ADMIN — Medication 1 TABLET(S): at 12:54

## 2022-07-14 RX ADMIN — TICAGRELOR 90 MILLIGRAM(S): 90 TABLET ORAL at 06:24

## 2022-07-14 RX ADMIN — PANTOPRAZOLE SODIUM 40 MILLIGRAM(S): 20 TABLET, DELAYED RELEASE ORAL at 06:24

## 2022-07-14 RX ADMIN — Medication 1000 MILLIGRAM(S): at 22:35

## 2022-07-14 RX ADMIN — Medication 975 MILLIGRAM(S): at 15:00

## 2022-07-14 RX ADMIN — Medication 400 MILLIGRAM(S): at 22:20

## 2022-07-14 RX ADMIN — Medication 975 MILLIGRAM(S): at 06:24

## 2022-07-14 RX ADMIN — SODIUM CHLORIDE 3 MILLILITER(S): 9 INJECTION INTRAMUSCULAR; INTRAVENOUS; SUBCUTANEOUS at 06:24

## 2022-07-14 RX ADMIN — CELECOXIB 200 MILLIGRAM(S): 200 CAPSULE ORAL at 18:31

## 2022-07-14 RX ADMIN — Medication 81 MILLIGRAM(S): at 18:31

## 2022-07-14 RX ADMIN — SPIRONOLACTONE 25 MILLIGRAM(S): 25 TABLET, FILM COATED ORAL at 06:25

## 2022-07-14 RX ADMIN — CELECOXIB 200 MILLIGRAM(S): 200 CAPSULE ORAL at 18:34

## 2022-07-14 RX ADMIN — TICAGRELOR 90 MILLIGRAM(S): 90 TABLET ORAL at 18:33

## 2022-07-14 RX ADMIN — SODIUM CHLORIDE 3 MILLILITER(S): 9 INJECTION INTRAMUSCULAR; INTRAVENOUS; SUBCUTANEOUS at 22:20

## 2022-07-14 RX ADMIN — Medication 30 MILLIGRAM(S): at 06:24

## 2022-07-14 RX ADMIN — SODIUM CHLORIDE 125 MILLILITER(S): 9 INJECTION INTRAMUSCULAR; INTRAVENOUS; SUBCUTANEOUS at 00:14

## 2022-07-14 RX ADMIN — POLYETHYLENE GLYCOL 3350 17 GRAM(S): 17 POWDER, FOR SOLUTION ORAL at 22:20

## 2022-07-14 RX ADMIN — CELECOXIB 200 MILLIGRAM(S): 200 CAPSULE ORAL at 07:24

## 2022-07-14 RX ADMIN — Medication 100 MILLIGRAM(S): at 08:30

## 2022-07-14 RX ADMIN — AMLODIPINE BESYLATE 10 MILLIGRAM(S): 2.5 TABLET ORAL at 06:25

## 2022-07-14 RX ADMIN — Medication 100 MILLIGRAM(S): at 00:15

## 2022-07-14 RX ADMIN — Medication 81 MILLIGRAM(S): at 06:24

## 2022-07-14 RX ADMIN — TRAMADOL HYDROCHLORIDE 50 MILLIGRAM(S): 50 TABLET ORAL at 08:17

## 2022-07-14 RX ADMIN — Medication 25 MILLIGRAM(S): at 06:25

## 2022-07-14 RX ADMIN — Medication 975 MILLIGRAM(S): at 14:03

## 2022-07-14 RX ADMIN — Medication 975 MILLIGRAM(S): at 07:24

## 2022-07-14 RX ADMIN — Medication 30 MILLIGRAM(S): at 13:10

## 2022-07-14 RX ADMIN — SODIUM CHLORIDE 3 MILLILITER(S): 9 INJECTION INTRAMUSCULAR; INTRAVENOUS; SUBCUTANEOUS at 13:00

## 2022-07-14 RX ADMIN — Medication 102 MILLIGRAM(S): at 06:26

## 2022-07-14 RX ADMIN — Medication 30 MILLIGRAM(S): at 12:53

## 2022-07-14 RX ADMIN — CELECOXIB 200 MILLIGRAM(S): 200 CAPSULE ORAL at 06:25

## 2022-07-14 RX ADMIN — TRAMADOL HYDROCHLORIDE 50 MILLIGRAM(S): 50 TABLET ORAL at 09:20

## 2022-07-14 RX ADMIN — Medication 30 MILLIGRAM(S): at 07:15

## 2022-07-14 NOTE — DISCHARGE NOTE NURSING/CASE MANAGEMENT/SOCIAL WORK - PATIENT PORTAL LINK FT
You can access the FollowMyHealth Patient Portal offered by [facility] by registering at the following website: [email or web address]. By joining [email or web address]’s FollowMyHealth portal, you will also be able to view your health information using other applications (apps) compatible with our system.

## 2022-07-14 NOTE — PHYSICAL THERAPY INITIAL EVALUATION ADULT - TRANSFER TRAINING, PT EVAL
Pt will independently perform sit to stand to sit transfers without LOB using rolling walker by discharge.

## 2022-07-14 NOTE — DISCHARGE NOTE PROVIDER - NSDCMRMEDTOKEN_GEN_ALL_CORE_FT
amLODIPine 10 mg oral tablet: 1 tab(s) orally once a day  aspirin 81 mg oral delayed release tablet: 1 tab(s) orally once a day  Brilinta (ticagrelor) 90 mg oral tablet: 1 tab(s) orally 2 times a day  hydroxychloroquine 200 mg oral tablet: 1 tab(s) orally once a day  omeprazole 40 mg oral delayed release capsule: 1 cap(s) orally once a day  please perform COVID PCR:   spironolactone-hydrochlorothiazide 25 mg-25 mg oral tablet: 1 tab(s) orally once a day   acetaminophen 325 mg oral tablet: 3 tab(s) orally every 8 hours  amLODIPine 10 mg oral tablet: 1 tab(s) orally once a day  aspirin 81 mg oral delayed release tablet: 1 tab(s) orally 2 times a day  atorvastatin 40 mg oral tablet: 1 tab(s) orally once a day  Brilinta (ticagrelor) 90 mg oral tablet: 1 tab(s) orally 2 times a day  celecoxib 200 mg oral capsule: 1 cap(s) orally every 12 hours MDD:2  Multiple Vitamins oral tablet: 1 tab(s) orally once a day  omeprazole 40 mg oral delayed release capsule: 1 cap(s) orally once a day  polyethylene glycol 3350 oral powder for reconstitution: 17 gram(s) orally once a day (at bedtime)  senna leaf extract oral tablet: 2 tab(s) orally once a day (at bedtime)  spironolactone-hydrochlorothiazide 25 mg-25 mg oral tablet: 1 tab(s) orally once a day

## 2022-07-14 NOTE — PHYSICAL THERAPY INITIAL EVALUATION ADULT - GENERAL OBSERVATIONS, REHAB EVAL
Pt was seen in supine c Chart reviewed and Event noted. bandage to R knee C/D/I, alert and Ox4. Pt was comfortable and motivated.

## 2022-07-14 NOTE — PHYSICAL THERAPY INITIAL EVALUATION ADULT - ADDITIONAL COMMENTS
Home setting: All amenities are on the same level. No need for stairs negotiation. Pt has a tub/shower combo c fixed shower head, no grab bar, and regular toilet seat  in BR. 3-1 commode will fit in BR. Pt is R handed and drives.

## 2022-07-14 NOTE — DISCHARGE NOTE PROVIDER - NSDCFUADDINST_GEN_ALL_CORE_FT
Keep Prineo Dressing Clean, Dry and Intact. May shower with Prineo Dressing. Please do not scrub, soak, peel or pick at the prineo dressing. No creams, lotions, or oils over dressing. May shower and let water run over incision, no baths. Pat dry once out of shower. Dressing to be removed in office at follow up visit in 2 weeks. There are no staples or stitches that need to be removed.  If you notice any redness, irritation, or itching around the prineo dressing call the surgeon's office    Keep knee straight while at rest. Elevate the leg as much as possible ("toes above the nose") to help control swelling. Make sure you get up and take a brief walk every two hours to help with circulation and prevent stiffness. Incentive spirometer 10X/hour. Cryocuff to help with pain/inflammation.

## 2022-07-14 NOTE — DISCHARGE NOTE PROVIDER - HOSPITAL COURSE
72yFemale with history of Aseptic Loosening of R total knee arthroplasty presenting for Revision R total knee arthroplasty by Dr. Adkins on 7/13/22. Risk and benefits of surgery were explained to the patient. The patient understood and agreed to proceed with surgery. Patient underwent the procedure with no intraoperative complications. Pt was brought in stable condition to the PACU. Once stable in PACU, pt was brought to the floor. During hospital stay pt was followed by Medicine, physical therapy, Home Care during this admission. Pt had an uneventful hospital course. Pt is stable for discharge to home 72yFemale with history of Aseptic Loosening of R total knee arthroplasty presenting for Revision R total knee arthroplasty by Dr. Adkins on 7/13/22. Risk and benefits of surgery were explained to the patient. The patient understood and agreed to proceed with surgery. Patient underwent the procedure with no intraoperative complications. Pt was brought in stable condition to the PACU. Once stable in PACU, pt was brought to the floor. During hospital stay pt was followed by Medicine, physical therapy, Home Care during this admission. Pt had an uneventful hospital course. Pt is stable for discharge to home on POD#2.

## 2022-07-14 NOTE — PHYSICAL THERAPY INITIAL EVALUATION ADULT - LIVES WITH, PROFILE
son in an apartment on the 11 floor c elevator.  No steps to get into apartment building. Son will be available, after work,  to assist pt in post -op care upon discharge home.

## 2022-07-14 NOTE — OCCUPATIONAL THERAPY INITIAL EVALUATION ADULT - DIAGNOSIS, OT EVAL
Abdominal Pain, N/V/D
M62.81 generalized weakness, decreased ADL management and functional transfers/mobility

## 2022-07-14 NOTE — DISCHARGE NOTE PROVIDER - NSDCFUADDAPPT_GEN_ALL_CORE_FT
Follow up with your surgeon in two weeks. Call for appointment.  If you need more pain medication, call your surgeon's office. For medication refills or authorizations, please call [phone] xt [phone]  We recommend that you call and schedule a follow up appointment within 2-4 weeks with your primary care physician for repeat blood work (CBC and BMP) for post hospital discharge follow-up care.  Call your surgeon if you have increased redness/pain/drainage or fever. Return to ER for shortness of breath/calf tenderness.

## 2022-07-14 NOTE — DISCHARGE NOTE NURSING/CASE MANAGEMENT/SOCIAL WORK - NSDCPEFALRISK_GEN_ALL_CORE
For information on Fall & Injury Prevention, visit: [email or web address] OR  [email or web address] OR  https://www.cdc.gov/steadi/patient.html

## 2022-07-14 NOTE — PHYSICAL THERAPY INITIAL EVALUATION ADULT - BALANCE TRAINING, PT EVAL
Pt will increase static/dynamic sitting balance to good and static/dynamic standing balance to good to perform all functional mobility without LOB, by 4 weeks.

## 2022-07-14 NOTE — OCCUPATIONAL THERAPY INITIAL EVALUATION ADULT - ADDITIONAL COMMENTS
Pre op assessment confirmed - pt lives with son in an apartment on the 11 floor with elevator access and not steps to negotiate. All living amenities are located on one level. The bathroom has a tub/shower combination, fixed shower head and standard toilet and with adequate space to fit a commode over it.  Pt endorsed that her son will be able to assist her after returning home from work during the week.

## 2022-07-14 NOTE — PHYSICAL THERAPY INITIAL EVALUATION ADULT - GAIT DEVIATIONS NOTED, PT EVAL
decreased selvin/increased time in double stance/decreased velocity of limb motion/decreased step length/decreased stride length

## 2022-07-14 NOTE — DISCHARGE NOTE NURSING/CASE MANAGEMENT/SOCIAL WORK - NSDCFUADDAPPT_GEN_ALL_CORE_FT
Follow up with your surgeon in two weeks. Call for appointment.  If you need more pain medication, call your surgeon's office. For medication refills or authorizations, please call [phone] xt [phone]  We recommend that you call and schedule a follow up appointment within 2-4 weeks with your primary care physician for repeat blood work (CBC and BMP) for post hospital discharge follow-up care.  Call your surgeon if you have increased redness/pain/drainage or fever. Return to ER for shortness of breath/calf tenderness.

## 2022-07-14 NOTE — DISCHARGE NOTE PROVIDER - NSDCFUSCHEDAPPT_GEN_ALL_CORE_FT
Saud Adkins  [facility] Physician [facility]  [address]  Scheduled Appointment: 07/26/2022

## 2022-07-14 NOTE — DISCHARGE NOTE PROVIDER - NSDCCPCAREPLAN_GEN_ALL_CORE_FT
PRINCIPAL DISCHARGE DIAGNOSIS  Diagnosis: Aseptic loosening of prosthetic knee  Assessment and Plan of Treatment:

## 2022-07-14 NOTE — DISCHARGE NOTE PROVIDER - CARE PROVIDER_API CALL
Saud Adkins)  Orthopaedic Surgery  [address] [place] [address]  Phone: [phone]  Fax: [phone]  Follow Up Time:

## 2022-07-15 VITALS
TEMPERATURE: 98 F | OXYGEN SATURATION: 97 % | HEART RATE: 50 BPM | SYSTOLIC BLOOD PRESSURE: 111 MMHG | RESPIRATION RATE: 18 BRPM | DIASTOLIC BLOOD PRESSURE: 69 MMHG

## 2022-07-15 LAB
ANION GAP SERPL CALC-SCNC: 6 MMOL/L — SIGNIFICANT CHANGE UP (ref 5–17)
BUN SERPL-MCNC: 23 MG/DL — SIGNIFICANT CHANGE UP (ref 7–23)
CALCIUM SERPL-MCNC: 9.2 MG/DL — SIGNIFICANT CHANGE UP (ref 8.5–10.1)
CHLORIDE SERPL-SCNC: 105 MMOL/L — SIGNIFICANT CHANGE UP (ref 96–108)
CO2 SERPL-SCNC: 31 MMOL/L — SIGNIFICANT CHANGE UP (ref 22–31)
CREAT SERPL-MCNC: 0.8 MG/DL — SIGNIFICANT CHANGE UP (ref 0.5–1.3)
EGFR: 78 ML/MIN/1.73M2 — SIGNIFICANT CHANGE UP
GLUCOSE SERPL-MCNC: 102 MG/DL — HIGH (ref 70–99)
HCT VFR BLD CALC: 39.3 % — SIGNIFICANT CHANGE UP (ref 34.5–45)
HGB BLD-MCNC: 12.5 G/DL — SIGNIFICANT CHANGE UP (ref 11.5–15.5)
MCHC RBC-ENTMCNC: 27.1 PG — SIGNIFICANT CHANGE UP (ref 27–34)
MCHC RBC-ENTMCNC: 31.8 G/DL — LOW (ref 32–36)
MCV RBC AUTO: 85.1 FL — SIGNIFICANT CHANGE UP (ref 80–100)
NRBC # BLD: 0 /100 WBCS — SIGNIFICANT CHANGE UP (ref 0–0)
PLATELET # BLD AUTO: 204 K/UL — SIGNIFICANT CHANGE UP (ref 150–400)
POTASSIUM SERPL-MCNC: 3.9 MMOL/L — SIGNIFICANT CHANGE UP (ref 3.5–5.3)
POTASSIUM SERPL-SCNC: 3.9 MMOL/L — SIGNIFICANT CHANGE UP (ref 3.5–5.3)
RBC # BLD: 4.62 M/UL — SIGNIFICANT CHANGE UP (ref 3.8–5.2)
RBC # FLD: 13.7 % — SIGNIFICANT CHANGE UP (ref 10.3–14.5)
SODIUM SERPL-SCNC: 142 MMOL/L — SIGNIFICANT CHANGE UP (ref 135–145)
SURGICAL PATHOLOGY STUDY: SIGNIFICANT CHANGE UP
WBC # BLD: 11.29 K/UL — HIGH (ref 3.8–10.5)
WBC # FLD AUTO: 11.29 K/UL — HIGH (ref 3.8–10.5)

## 2022-07-15 RX ORDER — ATORVASTATIN CALCIUM 80 MG/1
1 TABLET, FILM COATED ORAL
Qty: 0 | Refills: 0 | DISCHARGE

## 2022-07-15 RX ORDER — HYDROXYCHLOROQUINE SULFATE 200 MG
1 TABLET ORAL
Qty: 0 | Refills: 0 | DISCHARGE

## 2022-07-15 RX ADMIN — Medication 81 MILLIGRAM(S): at 06:38

## 2022-07-15 RX ADMIN — TICAGRELOR 90 MILLIGRAM(S): 90 TABLET ORAL at 06:38

## 2022-07-15 RX ADMIN — TICAGRELOR 90 MILLIGRAM(S): 90 TABLET ORAL at 17:16

## 2022-07-15 RX ADMIN — SODIUM CHLORIDE 3 MILLILITER(S): 9 INJECTION INTRAMUSCULAR; INTRAVENOUS; SUBCUTANEOUS at 06:37

## 2022-07-15 RX ADMIN — Medication 81 MILLIGRAM(S): at 17:16

## 2022-07-15 RX ADMIN — PANTOPRAZOLE SODIUM 40 MILLIGRAM(S): 20 TABLET, DELAYED RELEASE ORAL at 06:37

## 2022-07-15 RX ADMIN — Medication 1 TABLET(S): at 11:34

## 2022-07-15 RX ADMIN — Medication 975 MILLIGRAM(S): at 07:31

## 2022-07-15 RX ADMIN — CELECOXIB 200 MILLIGRAM(S): 200 CAPSULE ORAL at 17:16

## 2022-07-15 RX ADMIN — Medication 975 MILLIGRAM(S): at 16:56

## 2022-07-15 RX ADMIN — SPIRONOLACTONE 25 MILLIGRAM(S): 25 TABLET, FILM COATED ORAL at 06:38

## 2022-07-15 RX ADMIN — CELECOXIB 200 MILLIGRAM(S): 200 CAPSULE ORAL at 07:31

## 2022-07-15 RX ADMIN — CELECOXIB 200 MILLIGRAM(S): 200 CAPSULE ORAL at 18:00

## 2022-07-15 RX ADMIN — Medication 975 MILLIGRAM(S): at 15:53

## 2022-07-15 RX ADMIN — CELECOXIB 200 MILLIGRAM(S): 200 CAPSULE ORAL at 06:37

## 2022-07-15 RX ADMIN — SODIUM CHLORIDE 3 MILLILITER(S): 9 INJECTION INTRAMUSCULAR; INTRAVENOUS; SUBCUTANEOUS at 16:22

## 2022-07-15 RX ADMIN — Medication 975 MILLIGRAM(S): at 06:38

## 2022-07-15 NOTE — PROGRESS NOTE ADULT - SUBJECTIVE AND OBJECTIVE BOX
Patient is seen and examined at bedside. Denies CP/SOB/Dizziness/N/V/D/HA. Pain is controlled.     Vital Signs Last 24 Hrs  T(C): 36.4 (14 Jul 2022 09:08), Max: 36.7 (13 Jul 2022 14:16)  T(F): 97.6 (14 Jul 2022 09:08), Max: 98.1 (13 Jul 2022 14:16)  HR: 68 (14 Jul 2022 10:20) (50 - 68)  BP: 130/68 (14 Jul 2022 10:20) (100/64 - 156/73)  BP(mean): --  RR: 18 (14 Jul 2022 10:20) (13 - 19)  SpO2: 95% (14 Jul 2022 10:20) (95% - 99%)        PHYSICAL EXAM:  General: NAD, WDWN.   Neuro:  Alert & responsive  HEENT: NCAT, EOMI, conjunctiva clear  abd: soft, NT/ND  Right LE: Prineo dressing C/D/I. Motor intact + EHL/FHL/TA/GS.  Sensation is grossly intact.  Extremity warm, compartments soft, compressible. No calf tenderness. DP 2+   Left LE: Motor intact +EHL/FHL/TA/GS. Sensation is grossly intact. Extremity warm, compartments soft, compressible. No calf tenderness. DP2+    Labs:                          12.4   10.77 )-----------( 206      ( 14 Jul 2022 06:42 )             38.1       07-14    142  |  105  |  20  ----------------------------<  108<H>  4.1   |  32<H>  |  0.82    Ca    9.0      14 Jul 2022 06:42        A/P: Patient is a 72y y/o Female s/p right knee revision, POD # 1  -wound care, knee extension/leg elevation, cryocuff, isometric exercises, new medications reviewed with pt  -Pain control/analgesia discussed: tramadol not improving pain   -Inc spirometry reviewed with pt, demonstrated competence  -DVT prophylaxis with Venodynes/Aspirin 81mg BID  -PT/OT/WBAT  -medical consult reviewed   -DC planning: home with home care later today vs tomorrow  -D/W Dr Adkins     
Postop Check    Patient tolerated the procedure well. Patient seen and examined at bedside.  No acute complaints at this time. Pain well controlled. Denies chest pain, shortness of breath, nausea or vomiting. Denies new numbness/tingling RLE.     PE:  Vital Signs Last 24 Hrs  T(C): 36.7 (07-13-22 @ 20:40), Max: 36.7 (07-13-22 @ 14:16)  T(F): 98 (07-13-22 @ 20:40), Max: 98.1 (07-13-22 @ 14:16)  HR: 55 (07-13-22 @ 20:40) (52 - 64)  BP: 109/72 (07-13-22 @ 20:40) (109/72 - 156/73)  BP(mean): --  RR: 18 (07-13-22 @ 20:40) (13 - 18)  SpO2: 95% (07-13-22 @ 20:40) (95% - 99%)    General: NAD, resting comfortably in bed  RLE:   Dressing C/D/I  SCDs present bilaterally  Compartments soft and compressible  No calf tenderness bilaterally  +TA/EHL/FHL/GSC  SILT L3-S1  + DP                          13.1   6.76  )-----------( 213      ( 13 Jul 2022 18:30 )             39.8     13 Jul 2022 18:30    144    |  106    |  16     ----------------------------<  85     4.4     |  32     |  0.89     Ca    9.6        13 Jul 2022 18:30          A/P:  72y f s/p RIGHT revTKA POD 0    -PT/OT   -WBAT on the B/l LE  -Pain Control prn  -DVT ppx w/ A81 BID and home ticagrelor   -Continue perioperative abx x 24 hours  -FU AM Labs  -Rest, ice, compress and elevate the extremity as we needed  -Incentive Spirometry  -Medical co-management appreciated  -Dispo pending PT eval 
MICHELLE BONILLA is a 72y Female s/p RIGHT TOTAL KNEE ARTHROPLASTY REVISION ATTUNE        denies any chest pain shortness of breath palpitation dizziness lightheadedness nausea vomiting fever or chills    T(C): 36.7 (07-14-22 @ 15:38), Max: 36.7 (07-13-22 @ 19:40)  HR: 68 (07-14-22 @ 10:20) (50 - 68)  BP: 130/68 (07-14-22 @ 10:20) (100/64 - 137/64)  RR: 18 (07-14-22 @ 10:20) (13 - 19)  SpO2: 95% (07-14-22 @ 10:20) (95% - 98%)  no jvd/bruit  s1 s2 rrr  cta  s/nt/nd  no calf tend                        12.4   10.77 )-----------( 206      ( 14 Jul 2022 06:42 )             38.1   07-14    142  |  105  |  20  ----------------------------<  108<H>  4.1   |  32<H>  |  0.82    Ca    9.0      14 Jul 2022 06:42        cont dvt px  pain control  bowel regimen  antiemetics  incentive spirometer
MICHELLE BONILLA is a 72y Female s/p RIGHT TOTAL KNEE ARTHROPLASTY REVISION ATTUNE        denies any chest pain shortness of breath palpitation dizziness lightheadedness nausea vomiting fever or chills    T(C): 36.9 (07-15-22 @ 05:18), Max: 36.9 (07-15-22 @ 05:18)  HR: 60 (07-15-22 @ 05:18) (57 - 68)  BP: 137/81 (07-15-22 @ 05:18) (113/67 - 157/77)  RR: 18 (07-15-22 @ 05:18) (18 - 19)  SpO2: 99% (07-15-22 @ 05:18) (95% - 100%)  no jvd/bruit  s1 s2 rrr  cta  s/nt/nd  no calf tend                        12.5   11.29 )-----------( 204      ( 15 Jul 2022 06:00 )             39.3   07-15    142  |  105  |  23  ----------------------------<  102<H>  3.9   |  31  |  0.80    Ca    9.2      15 Jul 2022 06:00        cont dvt px  pain control  bowel regimen  antiemetics  incentive spirometer
Patient is seen and examined at bedside. Denies CP/SOB/Dizziness/N/V/D/HA. Pain is controlled with tylenol.  Pt feeling much better today. Attributes her symptoms yesterday to the tramadol.     Vital Signs Last 24 Hrs  T(C): 36.9 (15 Jul 2022 05:18), Max: 36.9 (15 Jul 2022 05:18)  T(F): 98.4 (15 Jul 2022 05:18), Max: 98.4 (15 Jul 2022 05:18)  HR: 60 (15 Jul 2022 05:18) (57 - 68)  BP: 137/81 (15 Jul 2022 05:18) (113/67 - 157/77)  BP(mean): --  RR: 18 (15 Jul 2022 05:18) (18 - 18)  SpO2: 99% (15 Jul 2022 05:18) (95% - 100%)    Parameters below as of 15 Jul 2022 05:18  Patient On (Oxygen Delivery Method): room air          PHYSICAL EXAM:  General: NAD, WDWN.   Neuro:  Alert & responsive  HEENT: NCAT, EOMI, conjunctiva clear  abd: soft, NT/ND  Right LE: Prineo dressing C/D/I. Knee with swelling at knee joint. Motor intact + EHL/FHL/TA/GS.  Sensation is grossly intact.  Extremity warm, compartments soft, compressible. No calf tenderness. DP 2+   Left LE: Motor intact +EHL/FHL/TA/GS. Sensation is grossly intact. Extremity warm, compartments soft, compressible. No calf tenderness. DP2+    Labs:                          12.5   11.29 )-----------( 204      ( 15 Jul 2022 06:00 )             39.3       07-15    142  |  105  |  23  ----------------------------<  102<H>  3.9   |  31  |  0.80    Ca    9.2      15 Jul 2022 06:00        A/P: Patient is a 72y y/o Female s/p right TKA revision, POD # 2  -wound care, knee extension/leg elevation, cryocuff, isometric exercises, new medications reviewed with pt  -Pain control/analgesia reviewed - pt does not want tramadol. Pain is well controlled with tylenol.   -Inc spirometry reviewed with pt, demonstrated competence  -DVT prophylaxis with Venodynes/Aspirin 81mg/Brelinta.   -PT/OT/WBAT   -medical consult reviewed   -DC planning: home with home care   -D/W DR Adkins
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
## 2022-07-19 DIAGNOSIS — T84.038A MECHANICAL LOOSENING OF OTHER INTERNAL PROSTHETIC JOINT, INITIAL ENCOUNTER: ICD-10-CM

## 2022-07-19 DIAGNOSIS — K21.9 GASTRO-ESOPHAGEAL REFLUX DISEASE WITHOUT ESOPHAGITIS: ICD-10-CM

## 2022-07-19 DIAGNOSIS — I25.10 ATHEROSCLEROTIC HEART DISEASE OF NATIVE CORONARY ARTERY WITHOUT ANGINA PECTORIS: ICD-10-CM
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
## 2022-07-26 ENCOUNTER — APPOINTMENT (OUTPATIENT)
Dept: ORTHOPEDIC SURGERY | Facility: CLINIC | Age: 72
End: 2022-07-26

## 2022-07-26 VITALS — WEIGHT: 155 LBS | HEIGHT: 66 IN | BODY MASS INDEX: 24.91 KG/M2

## 2022-07-26 PROCEDURE — 99024 POSTOP FOLLOW-UP VISIT: CPT

## 2022-07-26 PROCEDURE — 73562 X-RAY EXAM OF KNEE 3: CPT | Mod: RT

## 2022-07-26 NOTE — DISCUSSION/SUMMARY
[de-identified] : The patient is doing well at this time. The patient will be started on a course of physical therapy. I recommended that the patient works on range of motion at home and was shown how to do this. I encouraged the patient to increase ambulation. The patient can continue to take Tylenol for occasional discomfort. The patient was advised not to do any dental work for the first three months following the surgery. We will see the patient  back for a follow-up for a repeat evaluation. The patient  will call or return earlier for any questions or concerns.  Signs and symptoms of infection reviewed and patient advised to call immediately for redness, fevers, and/or chills. \par

## 2022-07-26 NOTE — IMAGING
[de-identified] : Incision is clean and dry with no drainage - dressing removed -\par Sensation intact\par Motor 5/5 \par +1 edema LE\par ROM 3-90\par \par Xray 3 views knee - Implants good position and well fixed

## 2022-07-26 NOTE — ASSESSMENT
[FreeTextEntry1] : Previous doc:\par has had w/u for infection and negative - rev was linear exchange and did not help her - no obv instability - may have some tibial loosening - ? on xray - ROM is moderate 3-90 -- recommend bone scan eval for tibial loosening - review old documents\par 1/25/22: Bone scan pos for loosening - asp today r/o infection. Had cardiac stent in Oct 2021.\par 3/1/22: Discussed rev TKA for aseptic loosening - she is going to discuss this with her cardiologist and come back to discuss booking surgery with Dr. Adkins.\par 4/12/22: Continuing R knee pain. 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 Discussed the procedure in detail and she would like to proceed. f/up in 1 month\par 5/24/22: Scheduled for R rev TKA in July. Risks and benefits again discussed and all questions answered. PCP believes elevated arsenic and mercury was from fish. Discussed risks due to antiphospholipid syndrome\par \par 7/26/22: ~2wks postop. Doing well today. Will begin outpatient PT

## 2022-07-26 NOTE — PHYSICAL EXAM
[Right] : right knee [] : lateral tibial plateau tenderness [TWNoteComboBox7] : flexion 90 degrees [de-identified] : extension -10 degrees

## 2022-07-26 NOTE — HISTORY OF PRESENT ILLNESS
[5] : 5 [6] : 6 [Dull/Aching] : dull/aching [] : Post Surgical Visit: yes [de-identified] : 7/26/22: 13 days s/p R TKA rev. She is doing well\par \par Prev doc:\par Right TKA 2014 Dr. Wood which helped initially then developed more pain. Rev 2018 Dr. Samson no relief - poly exchange and synovectomy - . Right LISA 2011 Dr. Wood no issue. Lately is having some burning pain in right hip/buttock. Recent cardiac stent 10/8/21 - on brilinta. - constant pain in knee and cannot go up stairs\par 1/25/22: F/U bone scan, cont pain.\par 3/1/22: Felt better for a day after the aspiration but pain has returned.\par 4/12/22: She followed up with her cardiogist- she is able to do surgery 6 months after stent placement \par 5/24/22: continued pain in right knee. Scheduled for R rev TKA 7/13 [FreeTextEntry1] : right knee [FreeTextEntry5] : Po visit #1 of right knee [de-identified] : motion [de-identified] : 7/13/2022 [de-identified] : 7/13/2022 [de-identified] : right total knee arthroplasty

## 2022-07-27 LAB
CULTURE RESULTS: SIGNIFICANT CHANGE UP
GRAM STN FLD: SIGNIFICANT CHANGE UP
SPECIMEN SOURCE: SIGNIFICANT CHANGE UP

## 2022-08-25 RX ORDER — CELECOXIB 200 MG/1
200 CAPSULE ORAL TWICE DAILY
Qty: 60 | Refills: 0 | Status: ACTIVE | COMMUNITY
Start: 2022-08-25 | End: 1900-01-01

## 2022-09-02 ENCOUNTER — APPOINTMENT (OUTPATIENT)
Dept: ORTHOPEDIC SURGERY | Facility: CLINIC | Age: 72
End: 2022-09-02

## 2022-09-02 VITALS — HEIGHT: 66 IN | BODY MASS INDEX: 24.91 KG/M2 | WEIGHT: 155 LBS

## 2022-09-02 PROCEDURE — 99024 POSTOP FOLLOW-UP VISIT: CPT

## 2022-09-02 PROCEDURE — 73562 X-RAY EXAM OF KNEE 3: CPT | Mod: RT

## 2022-09-02 NOTE — IMAGING
[de-identified] : Incision is clean and dry with no drainage; healing well \par Sensation intact\par Motor 5/5 \par +1 edema LE\par ROM 3-95\par \par Xray 3 views knee - Implants good position and well fixed

## 2022-09-02 NOTE — DISCUSSION/SUMMARY
[de-identified] : The incision was inspected and was clean and dry with no drainage.  The patient was instructed to call for fevers, chills, wound drainage, wound opening, redness, or any other concerns.\par \par Progress note completed by Niya May PA-C.\par The documentation recorded by the PA accurately reflects the service I personally performed and the decisions made by me. -Dr. Adkins

## 2022-09-02 NOTE — HISTORY OF PRESENT ILLNESS
[8] : 8 [6] : 6 [] : Post Surgical Visit: yes [de-identified] : 9/2/22: 7 weeks s/p R TKA rev. Celebrex provides some relief. She is slowly improving. \par \par Prev doc:\par Right TKA 2014 Dr. Wood which helped initially then developed more pain. Rev 2018 Dr. Samson no relief - poly exchange and synovectomy - . Right LISA 2011 Dr. Wood no issue. Lately is having some burning pain in right hip/buttock. Recent cardiac stent 10/8/21 - on brilinta. - constant pain in knee and cannot go up stairs\par 1/25/22: F/U bone scan, cont pain.\par 3/1/22: Felt better for a day after the aspiration but pain has returned.\par 4/12/22: She followed up with her cardiogist- she is able to do surgery 6 months after stent placement \par 5/24/22: continued pain in right knee. Scheduled for R rev TKA 7/13\par 7/26/22: 13 days s/p R TKA rev. She is doing well [FreeTextEntry1] : right knee  [de-identified] : physical therapy  [de-identified] : 07/13/2022 [de-identified] : right total knee arthroplasty

## 2022-09-02 NOTE — ASSESSMENT
[FreeTextEntry1] : Previous doc:\par has had w/u for infection and negative - rev was linear exchange and did not help her - no obv instability - may have some tibial loosening - ? on xray - ROM is moderate 3-90 -- recommend bone scan eval for tibial loosening - review old documents\par 1/25/22: Bone scan pos for loosening - asp today r/o infection. Had cardiac stent in Oct 2021.\par 3/1/22: Discussed rev TKA for aseptic loosening - she is going to discuss this with her cardiologist and come back to discuss booking surgery with Dr. Adkins.\par 4/12/22: Continuing R knee pain. 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 Discussed the procedure in detail and she would like to proceed. f/up in 1 month\par 5/24/22: Scheduled for R rev TKA in July. Risks and benefits again discussed and all questions answered. PCP believes elevated arsenic and mercury was from fish. Discussed risks due to antiphospholipid syndrome\par 7/26/22: ~2wks postop. Doing well today. Will begin outpatient PT\par \par 9/2/22: 7wks postop R TKA rev. Will have her continue with PT. Advised her swelling can take a while to improve; encouraged elevation and icing.

## 2022-09-27 ENCOUNTER — APPOINTMENT (OUTPATIENT)
Dept: ORTHOPEDIC SURGERY | Facility: CLINIC | Age: 72
End: 2022-09-27

## 2022-09-27 VITALS — BODY MASS INDEX: 24.91 KG/M2 | WEIGHT: 155 LBS | HEIGHT: 66 IN

## 2022-09-27 DIAGNOSIS — Z96.651 PRESENCE OF RIGHT ARTIFICIAL KNEE JOINT: ICD-10-CM

## 2022-09-27 PROCEDURE — 73562 X-RAY EXAM OF KNEE 3: CPT | Mod: RT

## 2022-09-27 PROCEDURE — 99024 POSTOP FOLLOW-UP VISIT: CPT

## 2022-09-27 RX ORDER — CELECOXIB 200 MG/1
200 CAPSULE ORAL DAILY
Qty: 30 | Refills: 0 | Status: ACTIVE | COMMUNITY
Start: 2022-09-27 | End: 1900-01-01

## 2022-09-27 NOTE — HISTORY OF PRESENT ILLNESS
[8] : 8 [7] : 7 [Radiating] : radiating [] : yes [de-identified] : 9/27/22: Worsening pain the past 1-2 weeks.  No injury, no fevers/chills.\par \par Prev doc:\par Right TKA 2014 Dr. Wood which helped initially then developed more pain. Rev 2018 Dr. Samson no relief - poly exchange and synovectomy - . Right LISA 2011 Dr. Wood no issue. Lately is having some burning pain in right hip/buttock. Recent cardiac stent 10/8/21 - on brilinta. - constant pain in knee and cannot go up stairs\par 1/25/22: F/U bone scan, cont pain.\par 3/1/22: Felt better for a day after the aspiration but pain has returned.\par 4/12/22: She followed up with her cardiogist- she is able to do surgery 6 months after stent placement \par 5/24/22: continued pain in right knee. Scheduled for R rev TKA 7/13\par 7/26/22: 13 days s/p R TKA rev. She is doing well\par 9/2/22: 7 weeks s/p R TKA rev. Celebrex provides some relief. She is slowly improving.  [FreeTextEntry5] : Pt states pain has worsened since last visit. Pt states has difficulty lifting leg. Pt is currently doing physical therapy, and finds it does help pain. Pt states soreness. Pt notices pain radiates to shin and to calf. Pt mentioned the knee feels warm to touch. [FreeTextEntry7] : shin and calf

## 2022-09-27 NOTE — DISCUSSION/SUMMARY
[de-identified] : The patient was advised of the diagnosis.  The natural history of the pathology was explained in full to the patient in layman's terms. All questions were answered.  The risks and benefits of surgical and non-surgical treatment alternatives were explained in full to the patient.\par

## 2022-09-27 NOTE — ASSESSMENT
[FreeTextEntry1] : Previous doc:\par has had w/u for infection and negative - rev was linear exchange and did not help her - no obv instability - may have some tibial loosening - ? on xray - ROM is moderate 3-90 -- recommend bone scan eval for tibial loosening - review old documents\par 1/25/22: Bone scan pos for loosening - asp today r/o infection. Had cardiac stent in Oct 2021.\par 3/1/22: Discussed rev TKA for aseptic loosening - she is going to discuss this with her cardiologist and come back to discuss booking surgery with Dr. Adkins.\par 4/12/22: Continuing R knee pain. 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 Discussed the procedure in detail and she would like to proceed. f/up in 1 month\par 5/24/22: Scheduled for R rev TKA in July. Risks and benefits again discussed and all questions answered. PCP believes elevated arsenic and mercury was from fish. Discussed risks due to antiphospholipid syndrome\par 7/26/22: ~2wks postop. Doing well today. Will begin outpatient PT\par 9/2/22: 7wks postop R TKA rev. Will have her continue with PT. Advised her swelling can take a while to improve; encouraged elevation and icing. \par \par 9/27/22: Good function in knee, recent worsening the past week.  No signs of infection and no XR change.  Cont PT, restart celebrex.

## 2022-10-25 ENCOUNTER — APPOINTMENT (OUTPATIENT)
Dept: ORTHOPEDIC SURGERY | Facility: CLINIC | Age: 72
End: 2022-10-25
Payer: MEDICARE

## 2022-10-25 VITALS — BODY MASS INDEX: 25.88 KG/M2 | WEIGHT: 161 LBS | HEIGHT: 66 IN

## 2022-10-25 PROCEDURE — 99212 OFFICE O/P EST SF 10 MIN: CPT

## 2022-10-25 NOTE — PHYSICAL EXAM
[de-identified] : Right knee: Inc well healed.  Mild effusion.  ROM 0-120 with slight extensor lag.  Stable.  Cane.  4/5 quad
No

## 2022-10-25 NOTE — ASSESSMENT
[FreeTextEntry1] : Previous doc:\par has had w/u for infection and negative - rev was linear exchange and did not help her - no obv instability - may have some tibial loosening - ? on xray - ROM is moderate 3-90 -- recommend bone scan eval for tibial loosening - review old documents\par 1/25/22: Bone scan pos for loosening - asp today r/o infection. Had cardiac stent in Oct 2021.\par 3/1/22: Discussed rev TKA for aseptic loosening - she is going to discuss this with her cardiologist and come back to discuss booking surgery with Dr. Adkins.\par 4/12/22: Continuing R knee pain. 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 Discussed the procedure in detail and she would like to proceed. f/up in 1 month\par 5/24/22: Scheduled for R rev TKA in July. Risks and benefits again discussed and all questions answered. PCP believes elevated arsenic and mercury was from fish. Discussed risks due to antiphospholipid syndrome\par 7/26/22: ~2wks postop. Doing well today. Will begin outpatient PT\par 9/2/22: 7wks postop R TKA rev. Will have her continue with PT. Advised her swelling can take a while to improve; encouraged elevation and icing. \par 9/27/22: Good function in knee, recent worsening the past week.  No signs of infection and no XR change.  Cont PT, restart celebrex.\par \par 10/25/22: 3 months s/p right rev TKA slowly making progress with PT. Will focus on quad strengthening for the next 6 weeks. has about 10 degree extensor lag she needs to work on

## 2022-10-25 NOTE — DISCUSSION/SUMMARY
[de-identified] : The patient was advised of the diagnosis.  The natural history of the pathology was explained in full to the patient in layman's terms. All questions were answered.  The risks and benefits of surgical and non-surgical treatment alternatives were explained in full to the patient.\par \par Entered by Riana Goldstein acting as scribe.\par \par

## 2022-10-25 NOTE — HISTORY OF PRESENT ILLNESS
[7] : 7 [9] : 9 [Dull/Aching] : dull/aching [Constant] : constant [Household chores] : household chores [Leisure] : leisure [Ice] : ice [Stairs] : stairs [Lying in bed] : lying in bed [] : Post Surgical Visit: yes [de-identified] : 10/25/22: 3 months s/p right rev TKA continues to have pain but is overall making slow progress with PT. Feels slightly better than preop\par \par Prev doc:\par Right TKA 2014 Dr. Wood which helped initially then developed more pain. Rev 2018 Dr. Samson no relief - poly exchange and synovectomy - . Right LISA 2011 Dr. Wood no issue. Lately is having some burning pain in right hip/buttock. Recent cardiac stent 10/8/21 - on brilinta. - constant pain in knee and cannot go up stairs\par 1/25/22: F/U bone scan, cont pain.\par 3/1/22: Felt better for a day after the aspiration but pain has returned.\par 4/12/22: She followed up with her cardiogist- she is able to do surgery 6 months after stent placement \par 5/24/22: continued pain in right knee. Scheduled for R rev TKA 7/13\par 7/26/22: 13 days s/p R TKA rev. She is doing well\par 9/2/22: 7 weeks s/p R TKA rev. Celebrex provides some relief. She is slowly improving. \par 9/27/22: Worsening pain the past 1-2 weeks.  No injury, no fevers/chills. [FreeTextEntry1] : Right knee [FreeTextEntry5] : MICHELLE BONILLA is a 72 year old F here for a PO/follow up for the right knee. Pt states her pain is worst at night. [FreeTextEntry7] : Pain radiates to right shin [de-identified] : 7/13/2022 [de-identified] : 10/24/2022 [de-identified] : 7/13/2022 [de-identified] : Right total knee arthroplasty

## 2022-11-29 ENCOUNTER — APPOINTMENT (OUTPATIENT)
Dept: ORTHOPEDIC SURGERY | Facility: CLINIC | Age: 72
End: 2022-11-29

## 2022-11-29 VITALS — HEIGHT: 66 IN | BODY MASS INDEX: 26.03 KG/M2 | WEIGHT: 162 LBS

## 2022-11-29 PROCEDURE — 73562 X-RAY EXAM OF KNEE 3: CPT | Mod: RT

## 2022-11-29 PROCEDURE — 99213 OFFICE O/P EST LOW 20 MIN: CPT

## 2022-11-29 RX ORDER — CELECOXIB 200 MG/1
200 CAPSULE ORAL
Qty: 60 | Refills: 1 | Status: ACTIVE | COMMUNITY
Start: 2022-11-29 | End: 1900-01-01

## 2022-11-29 NOTE — HISTORY OF PRESENT ILLNESS
[8] : 8 [10] : 10 [Dull/Aching] : dull/aching [Constant] : constant [Household chores] : household chores [Leisure] : leisure [Sleep] : sleep [Meds] : meds [Ice] : ice [Stairs] : stairs [] : yes [de-identified] : 11/29/22: 4 months s/p right rev TKA continues to have pain especially with stairs. She has been making good progress with PT. Pain lessens with walking. Using cane. Continues to have trouble with sleeping.\par \par Prev doc:\par Right TKA 2014 Dr. Wood which helped initially then developed more pain. Rev 2018 Dr. Samson no relief - poly exchange and synovectomy - . Right LISA 2011 Dr. Wood no issue. Lately is having some burning pain in right hip/buttock. Recent cardiac stent 10/8/21 - on brilinta. - constant pain in knee and cannot go up stairs\par 1/25/22: F/U bone scan, cont pain.\par 3/1/22: Felt better for a day after the aspiration but pain has returned.\par 4/12/22: She followed up with her cardiogist- she is able to do surgery 6 months after stent placement \par 5/24/22: continued pain in right knee. Scheduled for R rev TKA 7/13\par 7/26/22: 13 days s/p R TKA rev. She is doing well\par 9/2/22: 7 weeks s/p R TKA rev. Celebrex provides some relief. She is slowly improving. \par 9/27/22: Worsening pain the past 1-2 weeks.  No injury, no fevers/chills.\par 10/25/22: 3 months s/p right rev TKA continues to have pain but is overall making slow progress with PT. Feels slightly better than preop [FreeTextEntry1] : Right knee [FreeTextEntry5] : MICHELLE BONILLA is a 72 year old F here for a follow up for the right knee. She gets a bad pain at night that wakes her up. She notes that she is in a lot of pain.  [FreeTextEntry7] : Pain radiates into right shin & calf [de-identified] : 7/13/22 [de-identified] : 11/28/22

## 2022-11-29 NOTE — IMAGING
[Right] : right knee [AP] : anteroposterior [Lateral] : lateral [Suring] : skyline [Components well fixed, in good position] : Components well fixed, in good position [de-identified] : Right knee: Inc well healed.  lateral plateau tenderness. Mild effusion.  ROM 0-120 with slight extensor lag.  Stable.  Cane.  4/5 quad.  Ligaments stable.

## 2022-11-29 NOTE — ASSESSMENT
[FreeTextEntry1] : Previous doc:\par has had w/u for infection and negative - rev was linear exchange and did not help her - no obv instability - may have some tibial loosening - ? on xray - ROM is moderate 3-90 -- recommend bone scan eval for tibial loosening - review old documents\par 1/25/22: Bone scan pos for loosening - asp today r/o infection. Had cardiac stent in Oct 2021.\par 3/1/22: Discussed rev TKA for aseptic loosening - she is going to discuss this with her cardiologist and come back to discuss booking surgery with Dr. Adkins.\par 4/12/22: Continuing R knee pain. 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 Discussed the procedure in detail and she would like to proceed. f/up in 1 month\par 5/24/22: Scheduled for R rev TKA in July. Risks and benefits again discussed and all questions answered. PCP believes elevated arsenic and mercury was from fish. Discussed risks due to antiphospholipid syndrome\par 7/26/22: ~2wks postop. Doing well today. Will begin outpatient PT\par 9/2/22: 7wks postop R TKA rev. Will have her continue with PT. Advised her swelling can take a while to improve; encouraged elevation and icing. \par 9/27/22: Good function in knee, recent worsening the past week.  No signs of infection and no XR change.  Cont PT, restart celebrex.\par 10/25/22: 3 months s/p right rev TKA slowly making progress with PT. Will focus on quad strengthening for the next 6 weeks. has about 10 degree extensor lag she needs to work on \par \par 11/29/22: 4 months s/p right rev TKA with good ingrowth into the prothesis on XR. She continues to have pain and significant swelling. Recommend anti-inflammatory for now and will continue light PT. Follow up in 6 weeks. If not showing sig improv at that time may start workup for painful knee

## 2023-01-10 ENCOUNTER — APPOINTMENT (OUTPATIENT)
Dept: ORTHOPEDIC SURGERY | Facility: CLINIC | Age: 73
End: 2023-01-10
Payer: MEDICARE

## 2023-01-10 VITALS — HEIGHT: 66 IN | BODY MASS INDEX: 26.36 KG/M2 | WEIGHT: 164 LBS

## 2023-01-10 DIAGNOSIS — Z86.79 PERSONAL HISTORY OF OTHER DISEASES OF THE CIRCULATORY SYSTEM: ICD-10-CM

## 2023-01-10 PROCEDURE — 99214 OFFICE O/P EST MOD 30 MIN: CPT

## 2023-01-10 NOTE — ASSESSMENT
[FreeTextEntry1] : Previous doc:\par has had w/u for infection and negative - rev was linear exchange and did not help her - no obv instability - may have some tibial loosening - ? on xray - ROM is moderate 3-90 -- recommend bone scan eval for tibial loosening - review old documents\par 1/25/22: Bone scan pos for loosening - asp today r/o infection. Had cardiac stent in Oct 2021.\par 3/1/22: Discussed rev TKA for aseptic loosening - she is going to discuss this with her cardiologist and come back to discuss booking surgery with Dr. Adkins.\par 4/12/22: Continuing R knee pain. 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 Discussed the procedure in detail and she would like to proceed. f/up in 1 month\par 5/24/22: Scheduled for R rev TKA in July. Risks and benefits again discussed and all questions answered. PCP believes elevated arsenic and mercury was from fish. Discussed risks due to antiphospholipid syndrome\par 7/26/22: ~2wks postop. Doing well today. Will begin outpatient PT\par 9/2/22: 7wks postop R TKA rev. Will have her continue with PT. Advised her swelling can take a while to improve; encouraged elevation and icing. \par 9/27/22: Good function in knee, recent worsening the past week.  No signs of infection and no XR change.  Cont PT, restart celebrex.\par 10/25/22: 3 months s/p right rev TKA slowly making progress with PT. Will focus on quad strengthening for the next 6 weeks. has about 10 degree extensor lag she needs to work on \par 11/29/22: 4 months s/p right rev TKA with good ingrowth into the prothesis on XR. She continues to have pain and significant swelling. Recommend anti-inflammatory for now and will continue light PT. Follow up in 6 weeks. If not showing sig improv at that time may start workup for painful knee \par \par 1/10/23: Will obtain bloodwork to r/o infection. Continue with PT for strength and stability. If bloodwork is positive, will consider aspiration

## 2023-01-10 NOTE — IMAGING
[de-identified] : Right knee: Inc well healed.  lateral plateau tenderness. Mild effusion.  ROM 0-120 with slight extensor lag.  Stable.  Cane.  4/5 quad.  Ligaments stable.

## 2023-01-10 NOTE — HISTORY OF PRESENT ILLNESS
[8] : 8 [Dull/Aching] : dull/aching [Stabbing] : stabbing [Constant] : constant [Household chores] : household chores [Leisure] : leisure [Sleep] : sleep [de-identified] : 1/10/23: 6 months s/p right rev TKA. No sig change in her symptoms since last visit. Has been in PT. Pain with ascending steps. \par \par Prev doc:\par Right TKA 2014 Dr. Wood which helped initially then developed more pain. Rev 2018 Dr. Samson no relief - poly exchange and synovectomy - . Right LISA 2011 Dr. Wood no issue. Lately is having some burning pain in right hip/buttock. Recent cardiac stent 10/8/21 - on brilinta. - constant pain in knee and cannot go up stairs\par 1/25/22: F/U bone scan, cont pain.\par 3/1/22: Felt better for a day after the aspiration but pain has returned.\par 4/12/22: She followed up with her cardiogist- she is able to do surgery 6 months after stent placement \par 5/24/22: continued pain in right knee. Scheduled for R rev TKA 7/13\par 7/26/22: 13 days s/p R TKA rev. She is doing well\par 9/2/22: 7 weeks s/p R TKA rev. Celebrex provides some relief. She is slowly improving. \par 9/27/22: Worsening pain the past 1-2 weeks.  No injury, no fevers/chills.\par 10/25/22: 3 months s/p right rev TKA continues to have pain but is overall making slow progress with PT. Feels slightly better than preop\par 11/29/22: 4 months s/p right rev TKA continues to have pain especially with stairs. She has been making good progress with PT. Pain lessens with walking. Using cane. Continues to have trouble with sleeping. [] : no [FreeTextEntry1] : Right knee [FreeTextEntry5] : MICHELLE BONILLA is a 72 year old F here for a follow up for the right knee. Pt states that she's doing about the same since her last visit. Pt was recently diagnosed with PAD.

## 2023-01-17 ENCOUNTER — LABORATORY RESULT (OUTPATIENT)
Age: 73
End: 2023-01-17

## 2023-02-07 ENCOUNTER — APPOINTMENT (OUTPATIENT)
Dept: ORTHOPEDIC SURGERY | Facility: CLINIC | Age: 73
End: 2023-02-07
Payer: MEDICARE

## 2023-02-07 VITALS — BODY MASS INDEX: 26.36 KG/M2 | HEIGHT: 66 IN | WEIGHT: 164 LBS

## 2023-02-07 DIAGNOSIS — M17.11 UNILATERAL PRIMARY OSTEOARTHRITIS, RIGHT KNEE: ICD-10-CM

## 2023-02-07 PROCEDURE — 72170 X-RAY EXAM OF PELVIS: CPT

## 2023-02-07 PROCEDURE — 99214 OFFICE O/P EST MOD 30 MIN: CPT

## 2023-02-07 NOTE — IMAGING
[de-identified] : Right knee: Inc well healed.  lateral plateau tenderness. Mild effusion.  ROM 0-120 with slight extensor lag.  Stable.  Cane.  4/5 quad.  Ligaments stable.

## 2023-02-07 NOTE — ASSESSMENT
[FreeTextEntry1] : Previous doc:\par has had w/u for infection and negative - rev was linear exchange and did not help her - no obv instability - may have some tibial loosening - ? on xray - ROM is moderate 3-90 -- recommend bone scan eval for tibial loosening - review old documents\par 1/25/22: Bone scan pos for loosening - asp today r/o infection. Had cardiac stent in Oct 2021.\par 3/1/22: Discussed rev TKA for aseptic loosening - she is going to discuss this with her cardiologist and come back to discuss booking surgery with Dr. Adkins.\par 4/12/22: Continuing R knee pain. 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 Discussed the procedure in detail and she would like to proceed. f/up in 1 month\par 5/24/22: Scheduled for R rev TKA in July. Risks and benefits again discussed and all questions answered. PCP believes elevated arsenic and mercury was from fish. Discussed risks due to antiphospholipid syndrome\par 7/26/22: ~2wks postop. Doing well today. Will begin outpatient PT\par 9/2/22: 7wks postop R TKA rev. Will have her continue with PT. Advised her swelling can take a while to improve; encouraged elevation and icing. \par 9/27/22: Good function in knee, recent worsening the past week.  No signs of infection and no XR change.  Cont PT, restart celebrex.\par 10/25/22: 3 months s/p right rev TKA slowly making progress with PT. Will focus on quad strengthening for the next 6 weeks. has about 10 degree extensor lag she needs to work on \par 11/29/22: 4 months s/p right rev TKA with good ingrowth into the prothesis on XR. She continues to have pain and significant swelling. Recommend anti-inflammatory for now and will continue light PT. Follow up in 6 weeks. If not showing sig improv at that time may start workup for painful knee \par 1/10/23: Will obtain bloodwork to r/o infection. Continue with PT for strength and stability. If bloodwork is positive, will consider aspiration \par \par 2/7/23: No significant fluid on US, therefore deferred asp today. Her CRP/d-dimer is decreasing from last year unlikely infection. She still has significant pain especially mechanically with steps. Her hip XR shows thickening of cortices at the distal aspect of the stem. Possible loosening, will get bone scan to evaluate this and MRI lspine to eval nerve impingement to see if this is the underlying cause of her pain. She will continue PT for now.

## 2023-02-07 NOTE — HISTORY OF PRESENT ILLNESS
[6] : 6 [9] : 9 [Dull/Aching] : dull/aching [Throbbing] : throbbing [Constant] : constant [Household chores] : household chores [Leisure] : leisure [Sleep] : sleep [] : yes [de-identified] : 2/7/23: 7 months s/p right rev TKA - No significant change in symptoms. Most of the pain is with ambulation and stairs. + CRP and d-dimer. She is also having mild groin pain.\par \par Prev doc:\par Right TKA 2014 Dr. Wood which helped initially then developed more pain. Rev 2018 Dr. Samson no relief - poly exchange and synovectomy - . Right LISA 2011 Dr. Wood no issue. Lately is having some burning pain in right hip/buttock. Recent cardiac stent 10/8/21 - on brilinta. - constant pain in knee and cannot go up stairs\par 1/25/22: F/U bone scan, cont pain.\par 3/1/22: Felt better for a day after the aspiration but pain has returned.\par 4/12/22: She followed up with her cardiogist- she is able to do surgery 6 months after stent placement \par 5/24/22: continued pain in right knee. Scheduled for R rev TKA 7/13\par 7/26/22: 13 days s/p R TKA rev. She is doing well\par 9/2/22: 7 weeks s/p R TKA rev. Celebrex provides some relief. She is slowly improving. \par 9/27/22: Worsening pain the past 1-2 weeks.  No injury, no fevers/chills.\par 10/25/22: 3 months s/p right rev TKA continues to have pain but is overall making slow progress with PT. Feels slightly better than preop\par 11/29/22: 4 months s/p right rev TKA continues to have pain especially with stairs. She has been making good progress with PT. Pain lessens with walking. Using cane. Continues to have trouble with sleeping.\par 1/10/23: 6 months s/p right rev TKA. No sig change in her symptoms since last visit. Has been in PT. Pain with ascending steps.  [FreeTextEntry1] : Right knee [FreeTextEntry5] : MICHELLE BONILLA is a 72 year old F here for a follow up for the right knee. Pt states that she's doing about the same since her last visit.  [FreeTextEntry7] : Pain radiates to right calf

## 2023-03-14 ENCOUNTER — APPOINTMENT (OUTPATIENT)
Dept: ORTHOPEDIC SURGERY | Facility: CLINIC | Age: 73
End: 2023-03-14
Payer: MEDICARE

## 2023-03-14 VITALS — HEIGHT: 66 IN | WEIGHT: 166 LBS | BODY MASS INDEX: 26.68 KG/M2

## 2023-03-14 DIAGNOSIS — T84.032D MECHANICAL LOOSENING OF INTERNAL RIGHT KNEE PROSTHETIC JOINT, SUBSEQUENT ENCOUNTER: ICD-10-CM

## 2023-03-14 DIAGNOSIS — Z96.641 PRESENCE OF RIGHT ARTIFICIAL HIP JOINT: ICD-10-CM

## 2023-03-14 PROCEDURE — 99213 OFFICE O/P EST LOW 20 MIN: CPT

## 2023-03-14 NOTE — DISCUSSION/SUMMARY
[de-identified] : The patient was advised of the diagnosis.  The natural history of the pathology was explained in full to the patient in layman's terms. All questions were answered.  The risks and benefits of surgical and non-surgical treatment alternatives were explained in full to the patient.\par

## 2023-03-14 NOTE — ASSESSMENT
[FreeTextEntry1] : Previous doc:\par has had w/u for infection and negative - rev was linear exchange and did not help her - no obv instability - may have some tibial loosening - ? on xray - ROM is moderate 3-90 -- recommend bone scan eval for tibial loosening - review old documents\par 1/25/22: Bone scan pos for loosening - asp today r/o infection. Had cardiac stent in Oct 2021.\par 3/1/22: Discussed rev TKA for aseptic loosening - she is going to discuss this with her cardiologist and come back to discuss booking surgery with Dr. Adkins.\par 4/12/22: Continuing R knee pain. 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 Discussed the procedure in detail and she would like to proceed. f/up in 1 month\par 5/24/22: Scheduled for R rev TKA in July. Risks and benefits again discussed and all questions answered. PCP believes elevated arsenic and mercury was from fish. Discussed risks due to antiphospholipid syndrome\par 7/26/22: ~2wks postop. Doing well today. Will begin outpatient PT\par 9/2/22: 7wks postop R TKA rev. Will have her continue with PT. Advised her swelling can take a while to improve; encouraged elevation and icing. \par 9/27/22: Good function in knee, recent worsening the past week.  No signs of infection and no XR change.  Cont PT, restart celebrex.\par 10/25/22: 3 months s/p right rev TKA slowly making progress with PT. Will focus on quad strengthening for the next 6 weeks. has about 10 degree extensor lag she needs to work on \par 11/29/22: 4 months s/p right rev TKA with good ingrowth into the prothesis on XR. She continues to have pain and significant swelling. Recommend anti-inflammatory for now and will continue light PT. Follow up in 6 weeks. If not showing sig improv at that time may start workup for painful knee \par 1/10/23: Will obtain bloodwork to r/o infection. Continue with PT for strength and stability. If bloodwork is positive, will consider aspiration \par 2/7/23: No significant fluid on US, therefore deferred asp today. Her CRP/d-dimer is decreasing from last year unlikely infection. She still has significant pain especially mechanically with steps. Her hip XR shows thickening of cortices at the distal aspect of the stem. Possible loosening, will get bone scan to evaluate this and MRI lspine to eval nerve impingement to see if this is the underlying cause of her pain. She will continue PT for now. \par \par 3/14/23: Cont pain - bone scan neg (some uptake in right knee but only 8 months postop so this is expected).  MRI Lspine showing degen changes - will send to pain management to trial injections  - I do not see any mechanical source for her pain at this time regarding her hip or knee.

## 2023-03-14 NOTE — HISTORY OF PRESENT ILLNESS
[7] : 7 [9] : 9 [Dull/Aching] : dull/aching [de-identified] : 3/14/23: F/U bone scan and MRI Lspine.\par \par Prev doc:\par Right TKA 2014 Dr. Wood which helped initially then developed more pain. Rev 2018 Dr. Samson no relief - poly exchange and synovectomy - . Right LISA 2011 Dr. Wood no issue. Lately is having some burning pain in right hip/buttock. Recent cardiac stent 10/8/21 - on brilinta. - constant pain in knee and cannot go up stairs\par 1/25/22: F/U bone scan, cont pain.\par 3/1/22: Felt better for a day after the aspiration but pain has returned.\par 4/12/22: She followed up with her cardiogist- she is able to do surgery 6 months after stent placement \par 5/24/22: continued pain in right knee. Scheduled for R rev TKA 7/13\par 7/26/22: 13 days s/p R TKA rev. She is doing well\par 9/2/22: 7 weeks s/p R TKA rev. Celebrex provides some relief. She is slowly improving. \par 9/27/22: Worsening pain the past 1-2 weeks.  No injury, no fevers/chills.\par 10/25/22: 3 months s/p right rev TKA continues to have pain but is overall making slow progress with PT. Feels slightly better than preop\par 11/29/22: 4 months s/p right rev TKA continues to have pain especially with stairs. She has been making good progress with PT. Pain lessens with walking. Using cane. Continues to have trouble with sleeping.\par 1/10/23: 6 months s/p right rev TKA. No sig change in her symptoms since last visit. Has been in PT. Pain with ascending steps. \par 2/7/23: 7 months s/p right rev TKA - No significant change in symptoms. Most of the pain is with ambulation and stairs. + CRP and d-dimer. She is also having mild groin pain.

## 2023-03-14 NOTE — IMAGING
[de-identified] : Right knee: Inc well healed.  lateral plateau tenderness. Mild effusion.  ROM 0-120 with slight extensor lag.  Stable.  Cane.  4/5 quad.  Ligaments stable.

## 2023-03-28 ENCOUNTER — APPOINTMENT (OUTPATIENT)
Dept: PAIN MANAGEMENT | Facility: CLINIC | Age: 73
End: 2023-03-28
Payer: MEDICARE

## 2023-03-28 VITALS — BODY MASS INDEX: 26.68 KG/M2 | HEIGHT: 66 IN | WEIGHT: 166 LBS

## 2023-03-28 PROCEDURE — 99214 OFFICE O/P EST MOD 30 MIN: CPT

## 2023-03-28 NOTE — PHYSICAL EXAM
[de-identified] : Constitutional; Appears well, no apparent distress\par Ability to communicate: Normal \par Respiratory: non-labored breathing\par Skin: No rash noted\par Head: Normocephalic, atraumatic\par Neck: no visible thyroid enlargement\par Eyes: Extraocular movements intact\par Neurologic: Alert and oriented x3\par Psychiatric: normal mood, affect and behavior \par \par  [] : positive equivocal straight leg raise

## 2023-03-28 NOTE — DISCUSSION/SUMMARY
[de-identified] : After discussing various treatment options with the patient including but not limited to oral medications, physical therapy, exercise modalities as well as interventional spinal injections, we have decided with the following plan:\par \par - Continue Home exercises, stretching, activity modification, physical therapy, and conservative care.\par - MRI report and/or images was reviewed and discussed with the patient.\par - Recommend L4-5 Lumbar Epidural Steroid Injection under fluoroscopic guidance with image. (right) \par - The risks, benefits and alternatives of the proposed procedure were explained in detail with the patient. The risks outlined include but are not limited to infection, bleeding, post-dural puncture headache, nerve injury, a temporary increase in pain, failure to resolve symptoms, allergic reaction, symptom recurrence, and possible elevation of blood sugar in diabetics. All questions were answered to patient's apparent satisfaction and he/she verbalized an understanding.\par - Patient is presenting with acute/sub-acute radicular pain with impairment in ADLs and functionality.  The pain has not responded to conservative care including NSAID therapy and/or physical therapy.  There is no bleeding tendency, unstable medical condition, or systemic infection.\par - Follow up in 1-2 weeks post injection for re-evaluation.\par - Patient is on anticoagulation therapy and needs medical clearance to stop medication for the procedure (PLAVIX and ASA)\par \par - Patient advised to follow-up with primary care physician / nephrologist for evaluation of renal lesion/cyst(s) found on MRI.\par - Does complain of some b/l back and leg pain but most of her pain is in the right knee. Will trial PRITESH but I do not believe her knee pain is origination from her lumbar spine. \par \par

## 2023-03-28 NOTE — HISTORY OF PRESENT ILLNESS
[Lower back] : lower back [7] : 7 [Dull/Aching] : dull/aching [Constant] : constant [Household chores] : household chores [Nothing helps with pain getting better] : Nothing helps with pain getting better [Standing] : standing [Walking] : walking [] : no [FreeTextEntry1] : RT KNEE  [FreeTextEntry2] : NF 11/30/21  [FreeTextEntry7] : RT KNEE  [de-identified] : 2022  [de-identified] : l mri and rt knee

## 2023-07-17 ENCOUNTER — APPOINTMENT (OUTPATIENT)
Age: 73
End: 2023-07-17
Payer: MEDICARE

## 2023-07-17 PROCEDURE — 62323 NJX INTERLAMINAR LMBR/SAC: CPT

## 2023-08-01 ENCOUNTER — APPOINTMENT (OUTPATIENT)
Dept: PAIN MANAGEMENT | Facility: CLINIC | Age: 73
End: 2023-08-01

## 2023-08-11 ENCOUNTER — APPOINTMENT (OUTPATIENT)
Dept: PAIN MANAGEMENT | Facility: CLINIC | Age: 73
End: 2023-08-11
Payer: MEDICARE

## 2023-08-11 VITALS — BODY MASS INDEX: 26.68 KG/M2 | WEIGHT: 166 LBS | HEIGHT: 66 IN

## 2023-08-11 PROCEDURE — 99214 OFFICE O/P EST MOD 30 MIN: CPT

## 2023-08-11 NOTE — DISCUSSION/SUMMARY
[de-identified] : After discussing various treatment options with the patient including but not limited to oral medications, physical therapy, exercise modalities as well as interventional spinal injections, we have decided with the following plan:  - Continue Home exercises, stretching, activity modification, physical therapy, and conservative care. - MRI report and/or images was reviewed and discussed with the patient. - Recommend B/L L4-5 Transforaminal Epidural Steroid Injection under fluoroscopic guidance with image. - The risks, benefits and alternatives of the proposed procedure were explained in detail with the patient. The risks outlined include but are not limited to infection, bleeding, post-dural puncture headache, nerve injury, a temporary increase in pain, failure to resolve symptoms, allergic reaction, symptom recurrence, and possible elevation of blood sugar in diabetics. All questions were answered to patient's apparent satisfaction and he/she verbalized an understanding. - Patient is presenting with acute/sub-acute radicular pain with impairment in ADLs and functionality.  The pain has not responded to conservative care including NSAID therapy and/or physical therapy.  There is no bleeding tendency, unstable medical condition, or systemic infection. - Follow up in 1-2 weeks post injection for re-evaluation. - Patient is on anticoagulation therapy and needs medical clearance to stop medication for the procedure (PLAVIX and ASA) - Recommend f/u with Dr. Adkins for knee pain.

## 2023-08-11 NOTE — PHYSICAL EXAM
[de-identified] : Constitutional; Appears well, no apparent distress\par  Ability to communicate: Normal \par  Respiratory: non-labored breathing\par  Skin: No rash noted\par  Head: Normocephalic, atraumatic\par  Neck: no visible thyroid enlargement\par  Eyes: Extraocular movements intact\par  Neurologic: Alert and oriented x3\par  Psychiatric: normal mood, affect and behavior \par  \par   [] : no lumbar paraspinal tenderness

## 2023-08-11 NOTE — HISTORY OF PRESENT ILLNESS
[Lower back] : lower back [7] : 7 [Dull/Aching] : dull/aching [Constant] : constant [Household chores] : household chores [Nothing helps with pain getting better] : Nothing helps with pain getting better [Standing] : standing [Walking] : walking [] : no [FreeTextEntry1] : RT KNEE  [FreeTextEntry2] : NF 11/30/21  [FreeTextEntry7] : RT KNEE  [de-identified] : 2022  [de-identified] : l mri and rt knee

## 2023-09-06 ENCOUNTER — APPOINTMENT (OUTPATIENT)
Age: 73
End: 2023-09-06
Payer: MEDICARE

## 2023-09-06 PROCEDURE — 64483 NJX AA&/STRD TFRM EPI L/S 1: CPT | Mod: 50

## 2023-09-19 ENCOUNTER — APPOINTMENT (OUTPATIENT)
Dept: PAIN MANAGEMENT | Facility: CLINIC | Age: 73
End: 2023-09-19
Payer: MEDICARE

## 2023-09-19 VITALS — WEIGHT: 166 LBS | HEIGHT: 66 IN | BODY MASS INDEX: 26.68 KG/M2

## 2023-09-19 DIAGNOSIS — M54.17 RADICULOPATHY, LUMBOSACRAL REGION: ICD-10-CM

## 2023-09-19 DIAGNOSIS — M54.50 LOW BACK PAIN, UNSPECIFIED: ICD-10-CM

## 2023-09-19 DIAGNOSIS — M54.16 RADICULOPATHY, LUMBAR REGION: ICD-10-CM

## 2023-09-19 PROCEDURE — 99213 OFFICE O/P EST LOW 20 MIN: CPT

## 2023-10-03 ENCOUNTER — APPOINTMENT (OUTPATIENT)
Dept: ORTHOPEDIC SURGERY | Facility: CLINIC | Age: 73
End: 2023-10-03
Payer: MEDICARE

## 2023-10-03 VITALS — WEIGHT: 168 LBS | BODY MASS INDEX: 27 KG/M2 | HEIGHT: 66 IN

## 2023-10-03 DIAGNOSIS — Z96.651 PRESENCE OF RIGHT ARTIFICIAL KNEE JOINT: ICD-10-CM

## 2023-10-03 PROCEDURE — 99214 OFFICE O/P EST MOD 30 MIN: CPT

## 2023-10-03 PROCEDURE — 73562 X-RAY EXAM OF KNEE 3: CPT | Mod: RT

## 2023-11-02 ENCOUNTER — APPOINTMENT (OUTPATIENT)
Dept: PAIN MANAGEMENT | Facility: CLINIC | Age: 73
End: 2023-11-02
Payer: MEDICARE

## 2023-11-02 VITALS — WEIGHT: 168 LBS | HEIGHT: 66 IN | BODY MASS INDEX: 27 KG/M2

## 2023-11-02 DIAGNOSIS — I10 ESSENTIAL (PRIMARY) HYPERTENSION: ICD-10-CM

## 2023-11-02 DIAGNOSIS — Z96.651 PRESENCE OF RIGHT ARTIFICIAL KNEE JOINT: ICD-10-CM

## 2023-11-02 PROCEDURE — 99214 OFFICE O/P EST MOD 30 MIN: CPT

## 2023-11-10 ENCOUNTER — APPOINTMENT (OUTPATIENT)
Age: 73
End: 2023-11-10
Payer: MEDICARE

## 2023-11-10 PROCEDURE — 64450 NJX AA&/STRD OTHER PN/BRANCH: CPT | Mod: RT

## 2024-07-08 NOTE — H&P PST ADULT - MAMMOGRAM, LAST, PROFILE
Please notify the patient that microhematuria has resolved    F/u as scheduled    The patient should go to the ED should they develop fever, chills, nausea, vomiting, chest pain, SOB, calf pain, feelings of incomplete emptying, or should they otherwise feel they need evaluated     8-2021

## 2024-08-20 NOTE — OCCUPATIONAL THERAPY INITIAL EVALUATION ADULT - PLANNED THERAPY INTERVENTIONS, OT EVAL
4 ADL retraining/balance training/bed mobility training/ROM/strengthening/stretching/transfer training

## 2024-11-12 NOTE — PATIENT PROFILE ADULT - HISTORY OF COVID-19 VACCINATION
Yes [No Acute Distress] : no acute distress [Well Nourished] : well nourished [Well Developed] : well developed [Well-Appearing] : well-appearing [Normal Sclera/Conjunctiva] : normal sclera/conjunctiva [PERRL] : pupils equal round and reactive to light [EOMI] : extraocular movements intact [Normal Outer Ear/Nose] : the outer ears and nose were normal in appearance [Normal Oropharynx] : the oropharynx was normal [No JVD] : no jugular venous distention [No Lymphadenopathy] : no lymphadenopathy [Supple] : supple [Thyroid Normal, No Nodules] : the thyroid was normal and there were no nodules present [No Respiratory Distress] : no respiratory distress  [No Accessory Muscle Use] : no accessory muscle use [Clear to Auscultation] : lungs were clear to auscultation bilaterally [Normal Rate] : normal rate  [Regular Rhythm] : with a regular rhythm [Normal S1, S2] : normal S1 and S2 [No Murmur] : no murmur heard [No Carotid Bruits] : no carotid bruits [No Abdominal Bruit] : a ~M bruit was not heard ~T in the abdomen [No Varicosities] : no varicosities [Pedal Pulses Present] : the pedal pulses are present [No Edema] : there was no peripheral edema [No Palpable Aorta] : no palpable aorta [No Extremity Clubbing/Cyanosis] : no extremity clubbing/cyanosis [Soft] : abdomen soft [Non Tender] : non-tender [Non-distended] : non-distended [No Masses] : no abdominal mass palpated [No HSM] : no HSM [Normal Bowel Sounds] : normal bowel sounds [Normal Posterior Cervical Nodes] : no posterior cervical lymphadenopathy [Normal Anterior Cervical Nodes] : no anterior cervical lymphadenopathy [No CVA Tenderness] : no CVA  tenderness [No Spinal Tenderness] : no spinal tenderness [No Joint Swelling] : no joint swelling [Grossly Normal Strength/Tone] : grossly normal strength/tone [No Rash] : no rash [Coordination Grossly Intact] : coordination grossly intact [No Focal Deficits] : no focal deficits [Normal Gait] : normal gait [Deep Tendon Reflexes (DTR)] : deep tendon reflexes were 2+ and symmetric [Normal Affect] : the affect was normal [Normal Insight/Judgement] : insight and judgment were intact [de-identified] : obese [de-identified] : swelling of b/l LE  uses cane

## (undated) DEVICE — DRAPE SURGICAL #1010

## (undated) DEVICE — PREP SCRUB BRUSH W CHG 4%

## (undated) DEVICE — SUT STRATAFIX SPIRAL MONOCRYL PLUS 4-0 45CM PS-2 UNDYED

## (undated) DEVICE — DRAPE INSTRUMENT POUCH 6.75" X 11"

## (undated) DEVICE — GOWN XL

## (undated) DEVICE — SOL IRR BAG NS 0.9% 3000ML

## (undated) DEVICE — MIXER BONE CEMENT EVAC III

## (undated) DEVICE — HOOD T5 PEELAWAY

## (undated) DEVICE — SAW BLADE STRYKER MED AGGRESSIVE 9X25X0.38MM

## (undated) DEVICE — DRAPE 1/2 SHEET 40X57"

## (undated) DEVICE — MEDICATION LABELS W MARKER

## (undated) DEVICE — GLV 8.5 PROTEXIS (WHITE)

## (undated) DEVICE — SAW BLADE STRYKER SAGITTAL 25X0.89X75MM

## (undated) DEVICE — SYR LUER LOK 20CC

## (undated) DEVICE — OSTEOTOME 10MM X 2.5IN

## (undated) DEVICE — SUT VICRYL 0 27" CT-1 UNDYED

## (undated) DEVICE — PACK BASIC

## (undated) DEVICE — VENODYNE/SCD SLEEVE CALF MEDIUM

## (undated) DEVICE — CRYO/CUFF GRAVITY COOLER KNEE LARGE

## (undated) DEVICE — SUT STRATAFIX SYMMETRIC PDS PLUS 1 18" CTX VIOLET

## (undated) DEVICE — DRAPE TOWEL BLUE 17" X 24"

## (undated) DEVICE — NDL HYPO SAFE 22G X 1.5" (BLACK)

## (undated) DEVICE — ORTHOALIGN PLUS UNIT

## (undated) DEVICE — FRA-ESU BOVIE FORCE TRIAD T0E42286E: Type: DURABLE MEDICAL EQUIPMENT

## (undated) DEVICE — DRAPE EXTREMITY 87" X 128.5"

## (undated) DEVICE — PACK TOTAL JOINT

## (undated) DEVICE — ZIMMER PULSAVAC PLUS FAN KIT

## (undated) DEVICE — SAW BLADE STRYKER SAGITTAL DUAL CUT WITH FAN

## (undated) DEVICE — TRAP SPECIMEN SPUTUM 40CC

## (undated) DEVICE — DRSG DERMABOND PRINEO 22CM

## (undated) DEVICE — WARMING BLANKET UPPER ADULT

## (undated) DEVICE — TOURNIQUET ESMARK 6"

## (undated) DEVICE — SUCTION TIP KAMVAC MINI

## (undated) DEVICE — SUT STRATAFIX SPIRAL PDS PLUS 2-0 45CM CT-1

## (undated) DEVICE — DRAPE STERI-DRAPE INCISE 19X17"

## (undated) DEVICE — PREP CHLORAPREP HI-LITE ORANGE 26ML

## (undated) DEVICE — DRAPE 3/4 SHEET W REINFORCEMENT 56X77"

## (undated) DEVICE — DRAPE HIP W POUCHES 87X115X134"

## (undated) DEVICE — SAW BLADE STRYKER SAGITTAL DUAL CUT 25X90X1.27MM

## (undated) DEVICE — DRSG ACE BANDAGE 6"

## (undated) DEVICE — DRSG TELFA 3 X 8